# Patient Record
Sex: FEMALE | Race: WHITE | NOT HISPANIC OR LATINO | Employment: OTHER | URBAN - METROPOLITAN AREA
[De-identification: names, ages, dates, MRNs, and addresses within clinical notes are randomized per-mention and may not be internally consistent; named-entity substitution may affect disease eponyms.]

---

## 2017-02-02 RX ORDER — OMEPRAZOLE 20 MG/1
CAPSULE, DELAYED RELEASE ORAL
Qty: 90 CAPSULE | Refills: 0 | Status: SHIPPED | OUTPATIENT
Start: 2017-02-02 | End: 2017-05-01 | Stop reason: SDUPTHER

## 2017-03-09 ENCOUNTER — OFFICE VISIT (OUTPATIENT)
Dept: INTERNAL MEDICINE | Facility: CLINIC | Age: 82
End: 2017-03-09
Payer: MEDICARE

## 2017-03-09 VITALS
WEIGHT: 169.56 LBS | HEIGHT: 67 IN | BODY MASS INDEX: 26.61 KG/M2 | DIASTOLIC BLOOD PRESSURE: 60 MMHG | SYSTOLIC BLOOD PRESSURE: 104 MMHG | HEART RATE: 68 BPM | OXYGEN SATURATION: 96 %

## 2017-03-09 DIAGNOSIS — I10 HYPERTENSION, ESSENTIAL: Primary | ICD-10-CM

## 2017-03-09 DIAGNOSIS — D35.02 ADRENAL ADENOMA, LEFT: ICD-10-CM

## 2017-03-09 DIAGNOSIS — I25.10 CORONARY ARTERY DISEASE INVOLVING NATIVE CORONARY ARTERY OF NATIVE HEART WITHOUT ANGINA PECTORIS: ICD-10-CM

## 2017-03-09 DIAGNOSIS — R05.9 COUGH: ICD-10-CM

## 2017-03-09 DIAGNOSIS — K21.9 GASTROESOPHAGEAL REFLUX DISEASE WITHOUT ESOPHAGITIS: ICD-10-CM

## 2017-03-09 DIAGNOSIS — I67.9 CEREBROVASCULAR DISEASE: ICD-10-CM

## 2017-03-09 DIAGNOSIS — E78.5 HYPERLIPIDEMIA, UNSPECIFIED HYPERLIPIDEMIA TYPE: ICD-10-CM

## 2017-03-09 PROCEDURE — 99214 OFFICE O/P EST MOD 30 MIN: CPT | Mod: S$PBB,,, | Performed by: FAMILY MEDICINE

## 2017-03-09 PROCEDURE — 99999 PR PBB SHADOW E&M-EST. PATIENT-LVL III: CPT | Mod: PBBFAC,,, | Performed by: FAMILY MEDICINE

## 2017-03-09 PROCEDURE — 99213 OFFICE O/P EST LOW 20 MIN: CPT | Mod: PBBFAC | Performed by: FAMILY MEDICINE

## 2017-03-09 RX ORDER — LOSARTAN POTASSIUM AND HYDROCHLOROTHIAZIDE 12.5; 5 MG/1; MG/1
1 TABLET ORAL DAILY
Qty: 90 TABLET | Refills: 3 | Status: SHIPPED | OUTPATIENT
Start: 2017-03-09 | End: 2017-08-25 | Stop reason: ALTCHOICE

## 2017-03-09 NOTE — MR AVS SNAPSHOT
[facility] - [facility] Medicine  [address] [facility] [address]  Phone: [phone]  Fax: [phone]                  Cassidy Dahl   3/9/2017 1:00 PM   Office Visit    Description:  Female : 1934   Provider:  Marcel Lal MD   Department:  [facility] - Internal Medicine           Reason for Visit     Follow-up           Diagnoses this Visit        Comments    Hypertension, essential    -  Primary     Gastroesophageal reflux disease without esophagitis         Adrenal adenoma, left         Coronary artery disease involving native coronary artery of native heart without angina pectoris         Hyperlipidemia, unspecified hyperlipidemia type         Cerebrovascular disease         Cough                To Do List           Future Appointments        Provider Department Dept Phone    4/10/2017 9:00 AM [facility]1 64- LIMIT 450 LBS [facility] [phone]      Goals (5 Years of Data)     None      Follow-Up and Disposition     Return in about 2 months (around 2017) for to reassess treatment for condition or medication, Blood Pressure recheck.    Follow-up and Disposition History       These Medications        Disp Refills Start End    losartan-hydrochlorothiazide 50-12.5 mg (HYZAAR) 50-12.5 mg per tablet 90 tablet 3 3/9/2017     Take 1 tablet by mouth once daily. - Oral    Pharmacy: [facility] - [address] #: [phone]         [facility]     [facility] On [facility] Nurse [facility] Line -  Assistance  Registered nurses in the [facility] On Call Center provide clinical advisement, health education, appointment booking, and other advisory services.  Call for this free service at [phone].             Medications           Message regarding Medications     Verify the changes and/or additions to your medication regime listed below are the same as discussed with your clinician today.  If any of these changes  or additions are incorrect, please notify your healthcare provider.        START taking these NEW medications        Refills    losartan-hydrochlorothiazide 50-12.5 mg (HYZAAR) 50-12.5 mg per tablet 3    Sig: Take 1 tablet by mouth once daily.    Class: Normal    Route: Oral      STOP taking these medications     fluoxetine (PROZAC) 10 MG capsule Take 1 capsule (10 mg total) by mouth once daily.    valsartan-hydrochlorothiazide (DIOVAN-HCT) 320-12.5 mg per tablet Take 1 tablet by mouth nightly.           Verify that the below list of medications is an accurate representation of the medications you are currently taking.  If none reported, the list may be blank. If incorrect, please contact your healthcare provider. Carry this list with you in case of emergency.           Current Medications     amlodipine (NORVASC) 5 MG tablet Take 1 tablet (5 mg total) by mouth nightly.    aspirin 81 MG Chew Take 1 tablet (81 mg total) by mouth every evening.    clobetasol (TEMOVATE) 0.05 % cream Apply topically 2 (two) times daily.    coenzyme Q10 100 mg capsule Take 1 capsule (100 mg total) by mouth every evening. 1 Capsule Oral Every day- last taken 04/01/2016    metoprolol succinate (TOPROL-XL) 50 MG 24 hr tablet Take 1 tablet (50 mg total) by mouth once daily.    multivitamin (ONE DAILY MULTIVITAMIN) per tablet Take 1 tablet by mouth every evening.    omeprazole (PRILOSEC) 20 MG capsule TAKE 1 CAPSULE DAILY    pravastatin (PRAVACHOL) 40 MG tablet Take 1 tablet (40 mg total) by mouth nightly.    alclomethasone (ACLOVATE) 0.05 % cream Apply topically 2 (two) times daily. To affected areas on face, mixed with the ciclopirox cream    cholecalciferol, vitamin D3, (VITAMIN D3) 1,000 unit capsule Take 1 capsule (1,000 Units total) by mouth once daily.    ciclopirox (LOPROX) 0.77 % Crea Apply topically 2 (two) times daily. To affected areas on face, mixed with the alclometasone cream    ciclopirox (PENLAC) 8 % Soln Apply daily to  "affected nail. Must remove with rubbing alcohol once weekly and restart    econazole nitrate 1 % cream Apply topically 2 (two) times daily. To foot X 4 wks    losartan-hydrochlorothiazide 50-12.5 mg (HYZAAR) 50-12.5 mg per tablet Take 1 tablet by mouth once daily.           Clinical Reference Information           Your Vitals Were     BP Pulse Height Weight SpO2 BMI    104/60 (BP Location: Left arm, Patient Position: Sitting, BP Method: Manual) 68 5' 7" (1.702 m) 76.9 kg (169 lb 8.5 oz) 96% 26.55 kg/m2      Blood Pressure          Most Recent Value    BP  104/60      Allergies as of 3/9/2017     Iodine      Immunizations Administered on Date of Encounter - 3/9/2017     None      Orders Placed During Today's Visit      Normal Orders This Visit    Ambulatory referral to Cardiology       Instructions    Melatonin for sleep       Language Assistance Services     ATTENTION: Language assistance services are available, free of charge. Please call [phone].      ATENCIÓN: Si habla nikolay, tiene a guajardo disposición servicios gratuitos de asistencia lingüística. Llame al [phone].     ZEB Ý: N?u b?n nói Ti?ng Vi?t, có các d?ch v? h? tr? ngôn ng? mi?n phí cesar cho b?n. G?i s? [phone].         [facility] complies with applicable Federal civil rights laws and does not discriminate on the basis of race, color, national origin, age, disability, or sex.        "

## 2017-03-09 NOTE — PROGRESS NOTES
Subjective:       Patient ID: Cassidy Dahl is a 82 y.o. female.    Chief Complaint: Follow-up    HPI  Review of Systems   Constitutional: Positive for fatigue. Negative for chills and fever.   HENT: Negative for congestion and trouble swallowing.    Eyes: Negative for redness.   Respiratory: Positive for cough. Negative for chest tightness and shortness of breath.    Cardiovascular: Negative for chest pain, palpitations and leg swelling.   Gastrointestinal: Negative for abdominal pain and blood in stool.   Genitourinary: Negative for hematuria and menstrual problem.   Musculoskeletal: Negative for arthralgias, back pain, gait problem, joint swelling, myalgias and neck pain.   Skin: Negative for color change and rash.   Neurological: Negative for tremors, speech difficulty, weakness, numbness and headaches.   Hematological: Negative for adenopathy. Does not bruise/bleed easily.   Psychiatric/Behavioral: Positive for decreased concentration and sleep disturbance. Negative for behavioral problems and confusion. The patient is not nervous/anxious.        Objective:      Physical Exam   Constitutional: She is oriented to person, place, and time. She appears well-developed and well-nourished.   Eyes: No scleral icterus.   Neck: Normal range of motion. Neck supple.   Cardiovascular: Normal rate, normal heart sounds and intact distal pulses.  Exam reveals no gallop and no friction rub.    No murmur heard.  Pulmonary/Chest: Effort normal and breath sounds normal. No respiratory distress. She has no wheezes. She has no rales.   Abdominal: Soft. Bowel sounds are normal. She exhibits no abdominal bruit.   Musculoskeletal: She exhibits no edema.   Neurological: She is alert and oriented to person, place, and time. She displays no tremor. No cranial nerve deficit. Coordination and gait normal.   Skin: Skin is warm and dry. No rash noted. She is not diaphoretic. No erythema.   Psychiatric: She has a normal mood and affect. Her  behavior is normal. Judgment and thought content normal.   Nursing note and vitals reviewed.      Assessment:       1. Hypertension, essential    2. Gastroesophageal reflux disease without esophagitis    3. Adrenal adenoma, left    4. Coronary artery disease involving native coronary artery of native heart without angina pectoris    5. Hyperlipidemia, unspecified hyperlipidemia type    6. Cerebrovascular disease    7. Cough        Plan:   Cassidy was seen today for follow-up.    Diagnoses and all orders for this visit:    Hypertension, essential    Gastroesophageal reflux disease without esophagitis    Adrenal adenoma, left    Coronary artery disease involving native coronary artery of native heart without angina pectoris  -     Ambulatory referral to Cardiology    Hyperlipidemia, unspecified hyperlipidemia type    Cerebrovascular disease    Cough    Other orders  -     losartan-hydrochlorothiazide 50-12.5 mg (HYZAAR) 50-12.5 mg per tablet; Take 1 tablet by mouth once daily.      See meds, orders, follow up, routing and instructions sections of encounter.  Ms. Dahl is in for followup.  She is due for her adrenal incidentaloma   followup.    States she only took Prozac additional week and quit secondary to side effects.    Predominant complaints today include insomnia and also a cough, which tends to   be worse, nocturnal, she is also concerned about possible Carballo cyst or swelling   to the popliteal fossa of the right knee with minimal tenderness.    Suggested some sleep hygiene items and will change her valsartan to losartan,   consideration for cause of cough, though unlikely.  Follow up in a couple of   months for blood pressure recheck and symptom recheck.      MINE  dd: 03/09/2017 15:46:37 (CST)  td: 03/10/2017 05:46:43 (CST)  Doc ID   #[id number]  Job ID #[id number]    CC:

## 2017-03-21 ENCOUNTER — OFFICE VISIT (OUTPATIENT)
Dept: CARDIOLOGY | Facility: CLINIC | Age: 82
End: 2017-03-21
Payer: MEDICARE

## 2017-03-21 VITALS
SYSTOLIC BLOOD PRESSURE: 137 MMHG | HEART RATE: 64 BPM | WEIGHT: 168.88 LBS | HEIGHT: 67 IN | DIASTOLIC BLOOD PRESSURE: 63 MMHG | BODY MASS INDEX: 26.51 KG/M2

## 2017-03-21 DIAGNOSIS — I51.89 COMBINED SYSTOLIC AND DIASTOLIC CARDIAC DYSFUNCTION: Primary | ICD-10-CM

## 2017-03-21 DIAGNOSIS — E78.5 HYPERLIPIDEMIA, UNSPECIFIED HYPERLIPIDEMIA TYPE: Chronic | ICD-10-CM

## 2017-03-21 DIAGNOSIS — I10 HYPERTENSION, ESSENTIAL: Chronic | ICD-10-CM

## 2017-03-21 DIAGNOSIS — K21.9 GASTROESOPHAGEAL REFLUX DISEASE WITHOUT ESOPHAGITIS: ICD-10-CM

## 2017-03-21 DIAGNOSIS — I25.10 CORONARY ARTERY DISEASE INVOLVING NATIVE CORONARY ARTERY OF NATIVE HEART WITHOUT ANGINA PECTORIS: Chronic | ICD-10-CM

## 2017-03-21 PROCEDURE — 99213 OFFICE O/P EST LOW 20 MIN: CPT | Mod: PBBFAC | Performed by: INTERNAL MEDICINE

## 2017-03-21 PROCEDURE — 99213 OFFICE O/P EST LOW 20 MIN: CPT | Mod: S$PBB,,, | Performed by: INTERNAL MEDICINE

## 2017-03-21 PROCEDURE — 99999 PR PBB SHADOW E&M-EST. PATIENT-LVL III: CPT | Mod: PBBFAC,,, | Performed by: INTERNAL MEDICINE

## 2017-03-21 NOTE — LETTER
March 21, 2017      Marcel Lal MD  [address]  [place] [address]           [facility] - Cardiology  [address]  [place] [address]  Phone: [phone]          Patient: Cassidy Dahl   MR Number: [id number]   YOB: 1934   Date of Visit: 3/21/2017       Dear Dr. Marcel Lal:    Thank you for referring Cassidy Dahl to me for evaluation. Attached you will find relevant portions of my assessment and plan of care.    If you have questions, please do not hesitate to call me. I look forward to following Cassidy Dahl along with you.    Sincerely,    Ashley Angulo NP    Enclosure  CC:  No Recipients    If you would like to receive this communication electronically, please contact [email or web address] or [phone] to request more information on [employer] Link access.    For providers and/or their staff who would like to refer a patient to [facility], please contact us through our one-stop-shop provider referral line, [facility], at [phone].    If you feel you have received this communication in error or would no longer like to receive these types of communications, please e-mail [email or web address]

## 2017-03-21 NOTE — MR AVS SNAPSHOT
[facility] - Cardiology  [address]  [place] [address]  Phone: [phone]                  Cassidy Dahl   3/21/2017 2:00 PM   Office Visit    Description:  Female : 1934   Provider:  John Valenzuela MD   Department:  [facility] - Cardiology           Reason for Visit     Coronary Artery Disease           Diagnoses this Visit        Comments    Hypertension, essential    -  Primary     Coronary artery disease involving native coronary artery of native heart without angina pectoris         Hyperlipidemia, unspecified hyperlipidemia type         Gastroesophageal reflux disease without esophagitis                To Do List           Future Appointments        Provider Department Dept Phone    3/24/2017  8:45 AM [facility] [address] ALL [facility] [phone]    4/10/2017 9:00 AM [facility] CT1 64- LIMIT 450 LBS [facility] [phone]    5/15/2017 9:20 AM Marcel Lal MD [facility] - Internal Medicine [phone]      Goals (5 Years of Data)     None      Follow-Up and Disposition     Return in about 1 year (around 3/21/2018).      [facility]     [facility] Line -  Assistance  Registered nurses in the [facility] Center provide clinical advisement, health education, appointment booking, and other advisory services.  Call for this free service at [phone].             Medications           Message regarding Medications     Verify the changes and/or additions to your medication regime listed below are the same as discussed with your clinician today.  If any of these changes or additions are incorrect, please notify your healthcare provider.        STOP taking these medications     ciclopirox (PENLAC) 8 % Soln Apply daily to affected nail. Must remove with rubbing alcohol once weekly and restart           Verify that the below list of medications is an accurate representation of the medications you are currently taking.  If  "none reported, the list may be blank. If incorrect, please contact your healthcare provider. Carry this list with you in case of emergency.           Current Medications     alclomethasone (ACLOVATE) 0.05 % cream Apply topically 2 (two) times daily. To affected areas on face, mixed with the ciclopirox cream    amlodipine (NORVASC) 5 MG tablet Take 1 tablet (5 mg total) by mouth nightly.    aspirin 81 MG Chew Take 1 tablet (81 mg total) by mouth every evening.    cholecalciferol, vitamin D3, (VITAMIN D3) 1,000 unit capsule Take 1 capsule (1,000 Units total) by mouth once daily.    ciclopirox (LOPROX) 0.77 % Crea Apply topically 2 (two) times daily. To affected areas on face, mixed with the alclometasone cream    clobetasol (TEMOVATE) 0.05 % cream Apply topically 2 (two) times daily.    coenzyme Q10 100 mg capsule Take 1 capsule (100 mg total) by mouth every evening. 1 Capsule Oral Every day- last taken 04/01/2016    econazole nitrate 1 % cream Apply topically 2 (two) times daily. To foot X 4 wks    losartan-hydrochlorothiazide 50-12.5 mg (HYZAAR) 50-12.5 mg per tablet Take 1 tablet by mouth once daily.    metoprolol succinate (TOPROL-XL) 50 MG 24 hr tablet Take 1 tablet (50 mg total) by mouth once daily.    multivitamin (ONE DAILY MULTIVITAMIN) per tablet Take 1 tablet by mouth every evening.    omeprazole (PRILOSEC) 20 MG capsule TAKE 1 CAPSULE DAILY    pravastatin (PRAVACHOL) 40 MG tablet Take 1 tablet (40 mg total) by mouth nightly.           Clinical Reference Information           Your Vitals Were     BP Pulse Height Weight BMI    137/63 (BP Location: Left arm, Patient Position: Sitting, BP Method: Automatic) 64 5' 7" (1.702 m) 76.6 kg (168 lb 14 oz) 26.45 kg/m2      Blood Pressure          Most Recent Value    Right Arm BP - Sitting  141/63    Left Arm BP - Sitting  137/63    BP  137/63      Allergies as of 3/21/2017     Iodine      Immunizations Administered on Date of Encounter - 3/21/2017     None    "   Orders Placed During Today's Visit     Future Labs/Procedures Expected by Expires    Comprehensive metabolic panel  3/21/2017 (Approximate) 3/21/2018    Lipid panel  3/21/2017 (Approximate) 3/21/2018      Instructions    1. Increase activity slowly with a goal of walking 5 days a week for 30 or more minutes each time.   2. Follow a low salt heart healthy diet.  3. Return to cardiology in a year or sooner if you pass out or have other heart related symptoms.   4. Please have fasting labs drawn   Exercise for a Healthier Heart  You may wonder how you can improve the health of your heart. If youre thinking about exercise, youre on the right track. You dont need to become an athlete, but you do need a certain amount of brisk exercise to help strengthen your heart. If you have been diagnosed with a heart condition, your doctor may recommend exercise to help stabilize your condition. To help make exercise a habit, choose safe, fun activities.     Exercise with a friend. When activity is fun, you're more likely to stick with it.     Be sure to check with your healthcare provider before starting an exercise program.   Why exercise?  Exercising regularly offers many healthy rewards. It can help you do all of the following:  · Improve your blood cholesterol level to help prevent further heart trouble  · Lower your blood pressure to help prevent a stroke or heart attack  · Control diabetes, or reduce your risk of getting this disease  · Improve your heart and lung function  · Reach and maintain a healthy weight  · Make your muscles stronger and more limber so you can stay active  · Prevent falls and fractures by slowing the loss of bone mass (osteoporosis)  · Manage stress better  · Reduce your blood pressure  · Improve your sense of self and your body image  Exercise tips  Ease into your routine. Set small goals. Then build on them.  Exercise on most days. Aim for a total of 150 or more minutes of moderate to  vigorous  intensity activity each week. Consider 40 minutes, 3 to 4 times a week. For best results, activity should last for 40 minutes on average. It is OK to work up to the 40 minute period over time. Examples of moderate-intensity activity is walking 1 mile in 15 minutes or 30 to 45 minutes of yard work.  Step up your daily activity level. Along with your exercise program, try being more active throughout the day. Walk instead of drive. Do more household tasks or yard work.  Choose one or more activities you enjoy. Walking is one of the easiest things you can do. You can also try swimming, riding a bike, dancing, or taking an exercise class.  Stop exercising and call your doctor if you:  · Have chest pain or feel dizzy or lightheaded  · Feel burning, tightness, pressure, or heaviness in your chest, neck, shoulders, back, or arms  · Have unusual shortness of breath  · Have increased joint or muscle pain  · Have palpitations or an irregular heartbeat   Date Last Reviewed: 5/1/2016 © 2000-2016 [employer]. [address] [place] [address]. All rights reserved. This information is not intended as a substitute for professional medical care. Always follow your healthcare professional's instructions.             Language Assistance Services     ATTENTION: Language assistance services are available, free of charge. Please call [phone].      ATENCIÓN: Si habla español, tiene a guajardo disposición servicios gratuitos de asistencia lingüística. Llame al [phone].     CHÚ Ý: N?u b?n nói Ti?ng Vi?t, có các d?ch v? h? tr? ngôn ng? mi?n phí dành cho b?n. G?i s? [phone].         [facility] complies with applicable Federal civil rights laws and does not discriminate on the basis of race, color, national origin, age, disability, or sex.

## 2017-03-21 NOTE — PATIENT INSTRUCTIONS
1. Increase activity slowly with a goal of walking 5 days a week for 30 or more minutes each time.   2. Follow a low salt heart healthy diet.  3. Return to cardiology in a year or sooner if you pass out or have other heart related symptoms.   4. Please have fasting labs drawn   Exercise for a Healthier Heart  You may wonder how you can improve the health of your heart. If youre thinking about exercise, youre on the right track. You dont need to become an athlete, but you do need a certain amount of brisk exercise to help strengthen your heart. If you have been diagnosed with a heart condition, your doctor may recommend exercise to help stabilize your condition. To help make exercise a habit, choose safe, fun activities.     Exercise with a friend. When activity is fun, you're more likely to stick with it.     Be sure to check with your healthcare provider before starting an exercise program.   Why exercise?  Exercising regularly offers many healthy rewards. It can help you do all of the following:  · Improve your blood cholesterol level to help prevent further heart trouble  · Lower your blood pressure to help prevent a stroke or heart attack  · Control diabetes, or reduce your risk of getting this disease  · Improve your heart and lung function  · Reach and maintain a healthy weight  · Make your muscles stronger and more limber so you can stay active  · Prevent falls and fractures by slowing the loss of bone mass (osteoporosis)  · Manage stress better  · Reduce your blood pressure  · Improve your sense of self and your body image  Exercise tips  Ease into your routine. Set small goals. Then build on them.  Exercise on most days. Aim for a total of 150 or more minutes of moderate to  vigorous intensity activity each week. Consider 40 minutes, 3 to 4 times a week. For best results, activity should last for 40 minutes on average. It is OK to work up to the 40 minute period over time. Examples of moderate-intensity  activity is walking 1 mile in 15 minutes or 30 to 45 minutes of yard work.  Step up your daily activity level. Along with your exercise program, try being more active throughout the day. Walk instead of drive. Do more household tasks or yard work.  Choose one or more activities you enjoy. Walking is one of the easiest things you can do. You can also try swimming, riding a bike, dancing, or taking an exercise class.  Stop exercising and call your doctor if you:  · Have chest pain or feel dizzy or lightheaded  · Feel burning, tightness, pressure, or heaviness in your chest, neck, shoulders, back, or arms  · Have unusual shortness of breath  · Have increased joint or muscle pain  · Have palpitations or an irregular heartbeat   Date Last Reviewed: 5/1/2016  © 4517-7348 [employer]. [address] [place] [address]. All rights reserved. This information is not intended as a substitute for professional medical care. Always follow your healthcare professional's instructions.

## 2017-03-24 ENCOUNTER — HOSPITAL ENCOUNTER (OUTPATIENT)
Dept: RADIOLOGY | Facility: HOSPITAL | Age: 82
Discharge: HOME | End: 2017-03-24
Attending: FAMILY MEDICINE
Payer: MEDICARE

## 2017-03-24 DIAGNOSIS — R79.89 ELEVATED LFTS: ICD-10-CM

## 2017-03-24 DIAGNOSIS — R79.9 ABNORMAL BLOOD CHEMISTRY: ICD-10-CM

## 2017-03-24 PROCEDURE — 76700 US EXAM ABDOM COMPLETE: CPT | Mod: 26,,, | Performed by: RADIOLOGY

## 2017-03-24 PROCEDURE — 76700 US EXAM ABDOM COMPLETE: CPT | Mod: TC

## 2017-03-27 ENCOUNTER — TELEPHONE (OUTPATIENT)
Dept: GASTROENTEROLOGY | Facility: CLINIC | Age: 82
End: 2017-03-27

## 2017-03-27 DIAGNOSIS — K86.2 CYST AND PSEUDOCYST OF PANCREAS: Primary | ICD-10-CM

## 2017-03-27 DIAGNOSIS — K86.3 CYST AND PSEUDOCYST OF PANCREAS: Primary | ICD-10-CM

## 2017-03-30 ENCOUNTER — TELEPHONE (OUTPATIENT)
Dept: GASTROENTEROLOGY | Facility: CLINIC | Age: 82
End: 2017-03-30

## 2017-04-10 ENCOUNTER — HOSPITAL ENCOUNTER (OUTPATIENT)
Dept: RADIOLOGY | Facility: HOSPITAL | Age: 82
Discharge: HOME | End: 2017-04-10
Attending: FAMILY MEDICINE
Payer: MEDICARE

## 2017-04-10 DIAGNOSIS — D35.00 ADRENAL ADENOMA, UNSPECIFIED LATERALITY: ICD-10-CM

## 2017-04-10 PROCEDURE — 74150 CT ABDOMEN W/O CONTRAST: CPT | Mod: TC

## 2017-04-10 PROCEDURE — 74150 CT ABDOMEN W/O CONTRAST: CPT | Mod: 26,GC,, | Performed by: RADIOLOGY

## 2017-04-12 ENCOUNTER — PATIENT MESSAGE (OUTPATIENT)
Dept: RESEARCH | Facility: HOSPITAL | Age: 82
End: 2017-04-12

## 2017-04-14 RX ORDER — AMLODIPINE BESYLATE 5 MG/1
TABLET ORAL
Qty: 90 TABLET | Refills: 0 | Status: SHIPPED | OUTPATIENT
Start: 2017-04-14 | End: 2017-07-13 | Stop reason: SDUPTHER

## 2017-05-01 ENCOUNTER — TELEPHONE (OUTPATIENT)
Dept: ENDOSCOPY | Facility: HOSPITAL | Age: 82
End: 2017-05-01

## 2017-05-01 RX ORDER — OMEPRAZOLE 20 MG/1
CAPSULE, DELAYED RELEASE ORAL
Qty: 90 CAPSULE | Refills: 0 | Status: SHIPPED | OUTPATIENT
Start: 2017-05-01 | End: 2017-07-31 | Stop reason: SDUPTHER

## 2017-05-01 NOTE — TELEPHONE ENCOUNTER
Contacted patient regarding EUS scheduled 5/17/17 at 0900. Reviewed patient's medical history, allergies, and medications. Verbally reviewed prep instructions with patient. Patient verbalized understanding. Prep instructions mailed to patient. Patient has no further questions at this time.

## 2017-05-09 ENCOUNTER — TELEPHONE (OUTPATIENT)
Dept: GASTROENTEROLOGY | Facility: CLINIC | Age: 82
End: 2017-05-09

## 2017-05-16 ENCOUNTER — TELEPHONE (OUTPATIENT)
Dept: GASTROENTEROLOGY | Facility: CLINIC | Age: 82
End: 2017-05-16

## 2017-05-22 ENCOUNTER — TELEPHONE (OUTPATIENT)
Dept: GASTROENTEROLOGY | Facility: CLINIC | Age: 82
End: 2017-05-22

## 2017-06-14 ENCOUNTER — TELEPHONE (OUTPATIENT)
Dept: GASTROENTEROLOGY | Facility: CLINIC | Age: 82
End: 2017-06-14

## 2017-07-02 ENCOUNTER — PATIENT MESSAGE (OUTPATIENT)
Dept: INTERNAL MEDICINE | Facility: CLINIC | Age: 82
End: 2017-07-02

## 2017-07-05 ENCOUNTER — PATIENT MESSAGE (OUTPATIENT)
Dept: ADMINISTRATIVE | Facility: OTHER | Age: 82
End: 2017-07-05

## 2017-07-05 ENCOUNTER — OFFICE VISIT (OUTPATIENT)
Dept: INTERNAL MEDICINE | Facility: CLINIC | Age: 82
End: 2017-07-05
Attending: FAMILY MEDICINE
Payer: MEDICARE

## 2017-07-05 VITALS
BODY MASS INDEX: 25.44 KG/M2 | HEIGHT: 67 IN | DIASTOLIC BLOOD PRESSURE: 50 MMHG | OXYGEN SATURATION: 99 % | SYSTOLIC BLOOD PRESSURE: 180 MMHG | HEART RATE: 57 BPM | WEIGHT: 162.06 LBS

## 2017-07-05 DIAGNOSIS — Z78.9 STATIN INTOLERANCE: ICD-10-CM

## 2017-07-05 DIAGNOSIS — I10 HYPERTENSION, ESSENTIAL: Primary | ICD-10-CM

## 2017-07-05 DIAGNOSIS — I77.89 ENLARGED AORTA: ICD-10-CM

## 2017-07-05 DIAGNOSIS — N95.9 MENOPAUSAL AND PERIMENOPAUSAL DISORDER: ICD-10-CM

## 2017-07-05 DIAGNOSIS — E78.5 HYPERLIPIDEMIA, UNSPECIFIED HYPERLIPIDEMIA TYPE: ICD-10-CM

## 2017-07-05 DIAGNOSIS — I25.10 CORONARY ARTERY DISEASE INVOLVING NATIVE CORONARY ARTERY OF NATIVE HEART WITHOUT ANGINA PECTORIS: ICD-10-CM

## 2017-07-05 DIAGNOSIS — K21.9 GASTROESOPHAGEAL REFLUX DISEASE WITHOUT ESOPHAGITIS: ICD-10-CM

## 2017-07-05 PROCEDURE — 99213 OFFICE O/P EST LOW 20 MIN: CPT | Mod: PBBFAC | Performed by: FAMILY MEDICINE

## 2017-07-05 PROCEDURE — 1159F MED LIST DOCD IN RCRD: CPT | Mod: ,,, | Performed by: FAMILY MEDICINE

## 2017-07-05 PROCEDURE — 99999 PR PBB SHADOW E&M-EST. PATIENT-LVL III: CPT | Mod: PBBFAC,,, | Performed by: FAMILY MEDICINE

## 2017-07-05 PROCEDURE — G0009 ADMIN PNEUMOCOCCAL VACCINE: HCPCS | Mod: PBBFAC

## 2017-07-05 PROCEDURE — 90732 PPSV23 VACC 2 YRS+ SUBQ/IM: CPT | Mod: PBBFAC

## 2017-07-05 PROCEDURE — 1126F AMNT PAIN NOTED NONE PRSNT: CPT | Mod: ,,, | Performed by: FAMILY MEDICINE

## 2017-07-05 PROCEDURE — 99214 OFFICE O/P EST MOD 30 MIN: CPT | Mod: S$PBB,,, | Performed by: FAMILY MEDICINE

## 2017-07-05 NOTE — PROGRESS NOTES
Subjective:       Patient ID: Cassidy Dahl is a 82 y.o. female.    Chief Complaint: Follow-up    HPI  Review of Systems   Constitutional: Positive for fatigue. Negative for chills and fever.   HENT: Negative for congestion and trouble swallowing.    Eyes: Negative for redness.   Respiratory: Negative for cough, chest tightness and shortness of breath.    Cardiovascular: Negative for chest pain, palpitations and leg swelling.   Gastrointestinal: Negative for abdominal pain and blood in stool.   Genitourinary: Negative for hematuria and menstrual problem.   Musculoskeletal: Negative for arthralgias, back pain, gait problem, joint swelling, myalgias and neck pain.   Skin: Negative for color change and rash.   Neurological: Negative for tremors, speech difficulty, weakness, numbness and headaches.   Hematological: Negative for adenopathy. Does not bruise/bleed easily.   Psychiatric/Behavioral: Negative for behavioral problems, confusion and sleep disturbance. The patient is not nervous/anxious.        Objective:      Physical Exam   Constitutional: She is oriented to person, place, and time. She appears well-developed and well-nourished.   Eyes: No scleral icterus.   Neck: Normal range of motion. Neck supple.   Cardiovascular: Normal rate, normal heart sounds and intact distal pulses.  Exam reveals no gallop and no friction rub.    No murmur heard.  Pulmonary/Chest: Effort normal and breath sounds normal. No respiratory distress. She has no wheezes. She has no rales.   Abdominal: Soft. Bowel sounds are normal. She exhibits no abdominal bruit.   Musculoskeletal: She exhibits no edema.   Neurological: She is alert and oriented to person, place, and time. She displays no tremor. No cranial nerve deficit. Coordination and gait normal.   Skin: Skin is warm and dry. No rash noted. She is not diaphoretic. No erythema.   Psychiatric: She has a normal mood and affect. Her behavior is normal. Judgment and thought content  normal.   Nursing note and vitals reviewed.      Assessment:       1. Hypertension, essential    2. Hyperlipidemia, unspecified hyperlipidemia type    3. Gastroesophageal reflux disease without esophagitis    4. Coronary artery disease involving native coronary artery of native heart without angina pectoris    5. Enlarged aorta    6. Statin intolerance    7. Menopausal and perimenopausal disorder        Plan:   Cassidy was seen today for follow-up.    Diagnoses and all orders for this visit:    Hypertension, essential  -     Hypertension Digital Medicine (HDMP) Enrollment Order  -     Hypertension Digital Medicine ([facility]): Assign Onboarding Questionnaires    Hyperlipidemia, unspecified hyperlipidemia type  Comments:  not at goal, see below    Gastroesophageal reflux disease without esophagitis    Coronary artery disease involving native coronary artery of native heart without angina pectoris    Enlarged aorta    Statin intolerance  Comments:  prior difficulty with lipitor, currently tolerating pravastatin    Menopausal and perimenopausal disorder  -     DXA Bone Density Spine And Hip; Future    Other orders  -     Pneumococcal Polysaccharide Vaccine (23 Valent) (SQ/IM)      See meds, orders, follow up, routing and instructions sections of encounter.  Dictation #1  MRN:[id number]  CSN:[id number]  [facility] Dictation services are down due to external  issues in [place], not affecting Epic or internal information systems. Abbreviated chart note is provided pending restoration of services.  Has recently seen Dr. Valenzuela. No CAD sx at this time.

## 2017-07-07 ENCOUNTER — PATIENT OUTREACH (OUTPATIENT)
Dept: OTHER | Facility: OTHER | Age: 82
End: 2017-07-07

## 2017-07-07 NOTE — TELEPHONE ENCOUNTER
HTN Digital Medicine Program Medication Reconciliation Outreach    Called patient to introduce her into the HDMP. Reviewed program details including blood pressure goals, technique for taking readings (timing, cuff placement, body positioning, iHealth temitope), and what to do in case of emergency. Introduced patient to members of care team (health , clinician, and responsible provider). Verified patient's understanding of [facility] MyChart temitope use for contacting clinical team and to ensure that iHealth cuff readings continue to transmit by logging in once every 2 weeks. Her son, Gilmar, will be helping her with BP readings and using her iPhone during the program. Confirmation text sent and patient received.    Screening Questionnaire Review:  1. Depression - not indicated  2. Sleep apnea - not indicated       Verified the following information with the patient:  1. Medication list  Current Outpatient Prescriptions on File Prior to Visit   Medication Sig Dispense Refill    alclomethasone (ACLOVATE) 0.05 % cream Apply topically 2 (two) times daily. To affected areas on face, mixed with the ciclopirox cream 45 g 1    amlodipine (NORVASC) 5 MG tablet TAKE 1 TABLET NIGHTLY 90 tablet 0    aspirin 81 MG Chew Take 1 tablet (81 mg total) by mouth every evening.      cholecalciferol, vitamin D3, (VITAMIN D3) 1,000 unit capsule Take 1 capsule (1,000 Units total) by mouth once daily. 90 capsule 2    ciclopirox (LOPROX) 0.77 % Crea Apply topically 2 (two) times daily. To affected areas on face, mixed with the alclometasone cream 30 g 1    clobetasol (TEMOVATE) 0.05 % cream Apply topically 2 (two) times daily. 45 g 1    coenzyme Q10 100 mg capsule Take 1 capsule (100 mg total) by mouth every evening. 1 Capsule Oral Every day- last taken 04/01/2016      econazole nitrate 1 % cream Apply topically 2 (two) times daily. To foot X 4 wks 85 g 2    losartan-hydrochlorothiazide 50-12.5 mg (HYZAAR) 50-12.5 mg per tablet Take 1  tablet by mouth once daily. 90 tablet 3    metoprolol succinate (TOPROL-XL) 50 MG 24 hr tablet Take 1 tablet (50 mg total) by mouth once daily. 90 tablet 3    multivitamin (ONE DAILY MULTIVITAMIN) per tablet Take 1 tablet by mouth every evening.      omeprazole (PRILOSEC) 20 MG capsule TAKE 1 CAPSULE DAILY 90 capsule 0    pravastatin (PRAVACHOL) 40 MG tablet Take 1 tablet (40 mg total) by mouth nightly. 90 tablet 3     No current facility-administered medications on file prior to visit.        Previous ADRs  CCB: Nifedipine/amlodipine: swelling?    ACE: Lisinopril/Benazepril/etc    Diuretics: Cramping/etc    Beta Blockers: Fatigue?    2. Medication compliance: has been compliant with the medicaiton regimen    3. Medication Allergies:   Review of patient's allergies indicates:   Allergen Reactions    Iodine      Other reaction(s): Hives, invasive    Lipitor [atorvastatin]      Leg pain       Explained that our goal is to get her BP consistently below 140/90mmHg.  Patient denies having further questions, concerns. BP is not at goal, patient will begin taking more readings as able.     Last 5 Patient Entered [place] Current 30 Day Average: 140/60     Recent Readings 7/6/2017 7/5/2017    Systolic BP (mmHg) 142 138    Diastolic BP (mmHg) 64 56    Pulse 60 64

## 2017-07-07 NOTE — LETTER
Shobha Lemons, Karel  [address] [place] [address]     Dear Cassidy Dahl,    Welcome to the [facility] Hypertension Digital Medicine Program!         My name is Shobha Lemons PharmD and I am your dedicated Digital Medicine clinician.  As an expert in medication management, I will help ensure that the medications you are taking continue to provide you with the intended benefits.      I am Chandni Biggs and I will be your health  for the duration of the program.  My  job is to help you identify lifestyle changes to improve your blood pressure control.  We will talk about nutrition, exercise, and other ways that you may be able to adjust your current habits to better your health. Together, we will work to improve your overall health and encourage you to meet your goals for a healthier lifestyle.    What we expect from YOU:    You will need to take blood pressure readings multiple times a week and no less than one reading per week.   It is important that you take your measurements at different times during the day, when possible.     What you should expect from your Digital Medicine Care Team:   We will provide you with education about high blood pressure, including lifestyle changes that could help you to control your blood pressure.   We will review your weekly readings and provide you with monthly blood pressure progress reports after you have been in the program for more than 30 days.   We will send monthly progress reports on your blood pressure control to your physician so they can follow along with your progress as well.    You will be able to reach me by phone at [phone] or through your [facility] account by clicking my name under Care Team on the right side of the home screen.    I look forward to working with you to achieve your blood pressure goals!    Sincerely,    Shobha Lemons PharmD  Your personal clinician    Please visit [email or web address] to  learn more about high blood pressure and what you can do lower your blood pressure.                                                                                           Cassidy Dahl  [address] [facility] [place] [address]

## 2017-07-10 ENCOUNTER — TELEPHONE (OUTPATIENT)
Dept: ENDOSCOPY | Facility: HOSPITAL | Age: 82
End: 2017-07-10

## 2017-07-13 ENCOUNTER — PATIENT OUTREACH (OUTPATIENT)
Dept: OTHER | Facility: OTHER | Age: 82
End: 2017-07-13

## 2017-07-13 RX ORDER — AMLODIPINE BESYLATE 5 MG/1
TABLET ORAL
Qty: 90 TABLET | Refills: 0 | Status: SHIPPED | OUTPATIENT
Start: 2017-07-13 | End: 2017-10-11 | Stop reason: SDUPTHER

## 2017-07-13 NOTE — PROGRESS NOTES
Last 5 Patient Entered [place] Current 30 Day Average: 146/62     Recent Readings 7/13/2017 7/12/2017 7/12/2017 7/11/2017 7/10/2017    Systolic BP (mmHg) 140 144 140 151 156    Diastolic BP (mmHg) 67 53 58 59 71    Pulse 58 58 31 59 57          Feels she follows a mostly low sodium diet but does eat pizza. Doesn't add salt to food.  Walking to stay active.  Non smoker.  Glass of wine maybe 2x/yr.  Low heart rate. Feels fatigued very often.  Sounds as if she has some difficulty breathing/catching breath.  Daughter is a Nurse. Sends her daughter her readings also.    Initial introduction completed with the Ms. Cassidy CAI Nabila and the role of the health  was explained via [facility].   Expectations and the process of the program was explained as well. I encouraged her to call or message me back with any questions she may have. I will follow up in a few weeks to see how she are doing.     Resources on diet and exercise were sent.     she is aware that I am not available for emergencies and to call 911 or [facility] if one arises.  she is aware of the importance of medication adherence.  she is aware of the importance of diet and exercise.  she is aware that his sodium intake should be no more than 2000mg per day.  she is aware that the recommended physical activity each week should be about 30 minutes per day at least 5 times per week.   she is aware of the importance of taking BP readings at least weekly if not more and during different times each day.  she is aware that the health  can be used as a resource for lifestyle modifications to help reduce or maintain a healthy BP

## 2017-07-19 ENCOUNTER — PATIENT MESSAGE (OUTPATIENT)
Dept: INTERNAL MEDICINE | Facility: CLINIC | Age: 82
End: 2017-07-19

## 2017-07-19 ENCOUNTER — TELEPHONE (OUTPATIENT)
Dept: INTERNAL MEDICINE | Facility: CLINIC | Age: 82
End: 2017-07-19

## 2017-07-19 NOTE — TELEPHONE ENCOUNTER
----- Message from Tiffany Lim MA sent at 7/19/2017  4:15 PM CDT -----  Contact: van / Jenny - [phone]   Type: Returning a call    Who left a message? Melanie     When did the practice call? 7/19/17    Comments: stated another doctor can sign. Please call. Thanks!

## 2017-07-19 NOTE — TELEPHONE ENCOUNTER
Spoke to pt informed pt  will be out till Monday and to her her daughter give us a call back in what she will like to do next

## 2017-07-31 RX ORDER — OMEPRAZOLE 20 MG/1
20 CAPSULE, DELAYED RELEASE ORAL DAILY
Qty: 90 CAPSULE | Refills: 0 | Status: SHIPPED | OUTPATIENT
Start: 2017-07-31 | End: 2017-10-29 | Stop reason: SDUPTHER

## 2017-08-02 ENCOUNTER — ANESTHESIA EVENT (OUTPATIENT)
Dept: ENDOSCOPY | Facility: HOSPITAL | Age: 82
End: 2017-08-02
Payer: MEDICARE

## 2017-08-03 ENCOUNTER — SURGERY (OUTPATIENT)
Age: 82
End: 2017-08-03

## 2017-08-03 ENCOUNTER — ANESTHESIA (OUTPATIENT)
Dept: ENDOSCOPY | Facility: HOSPITAL | Age: 82
End: 2017-08-03
Payer: MEDICARE

## 2017-08-03 ENCOUNTER — HOSPITAL ENCOUNTER (OUTPATIENT)
Facility: HOSPITAL | Age: 82
Discharge: HOME | End: 2017-08-03
Attending: INTERNAL MEDICINE | Admitting: INTERNAL MEDICINE
Payer: MEDICARE

## 2017-08-03 VITALS
HEIGHT: 67 IN | OXYGEN SATURATION: 100 % | BODY MASS INDEX: 24.8 KG/M2 | RESPIRATION RATE: 19 BRPM | SYSTOLIC BLOOD PRESSURE: 163 MMHG | TEMPERATURE: 98 F | HEART RATE: 72 BPM | DIASTOLIC BLOOD PRESSURE: 44 MMHG | WEIGHT: 158 LBS

## 2017-08-03 DIAGNOSIS — K86.2 PANCREAS CYST: ICD-10-CM

## 2017-08-03 PROCEDURE — 37000009 HC ANESTHESIA EA ADD 15 MINS: Performed by: INTERNAL MEDICINE

## 2017-08-03 PROCEDURE — 43259 EGD US EXAM DUODENUM/JEJUNUM: CPT | Mod: ,,, | Performed by: INTERNAL MEDICINE

## 2017-08-03 PROCEDURE — D9220A PRA ANESTHESIA: Mod: ANES,,, | Performed by: ANESTHESIOLOGY

## 2017-08-03 PROCEDURE — 37000008 HC ANESTHESIA 1ST 15 MINUTES: Performed by: INTERNAL MEDICINE

## 2017-08-03 PROCEDURE — 25000003 PHARM REV CODE 250: Performed by: NURSE ANESTHETIST, CERTIFIED REGISTERED

## 2017-08-03 PROCEDURE — 63600175 PHARM REV CODE 636 W HCPCS: Performed by: NURSE ANESTHETIST, CERTIFIED REGISTERED

## 2017-08-03 PROCEDURE — 25000003 PHARM REV CODE 250: Performed by: INTERNAL MEDICINE

## 2017-08-03 PROCEDURE — D9220A PRA ANESTHESIA: Mod: CRNA,,, | Performed by: NURSE ANESTHETIST, CERTIFIED REGISTERED

## 2017-08-03 PROCEDURE — 43259 EGD US EXAM DUODENUM/JEJUNUM: CPT | Performed by: INTERNAL MEDICINE

## 2017-08-03 RX ORDER — PROPOFOL 10 MG/ML
VIAL (ML) INTRAVENOUS CONTINUOUS PRN
Status: DISCONTINUED | OUTPATIENT
Start: 2017-08-03 | End: 2017-08-03

## 2017-08-03 RX ORDER — SODIUM CHLORIDE 9 MG/ML
INJECTION, SOLUTION INTRAVENOUS CONTINUOUS
Status: DISCONTINUED | OUTPATIENT
Start: 2017-08-03 | End: 2017-08-03 | Stop reason: HOSPADM

## 2017-08-03 RX ORDER — LIDOCAINE HCL/PF 100 MG/5ML
SYRINGE (ML) INTRAVENOUS
Status: DISCONTINUED | OUTPATIENT
Start: 2017-08-03 | End: 2017-08-03

## 2017-08-03 RX ORDER — SODIUM CHLORIDE 0.9 % (FLUSH) 0.9 %
3 SYRINGE (ML) INJECTION
Status: DISCONTINUED | OUTPATIENT
Start: 2017-08-03 | End: 2017-08-03 | Stop reason: HOSPADM

## 2017-08-03 RX ORDER — GLYCOPYRROLATE 0.2 MG/ML
INJECTION INTRAMUSCULAR; INTRAVENOUS
Status: DISCONTINUED | OUTPATIENT
Start: 2017-08-03 | End: 2017-08-03

## 2017-08-03 RX ORDER — PROPOFOL 10 MG/ML
VIAL (ML) INTRAVENOUS
Status: DISCONTINUED | OUTPATIENT
Start: 2017-08-03 | End: 2017-08-03

## 2017-08-03 RX ADMIN — PROPOFOL 40 MG: 10 INJECTION, EMULSION INTRAVENOUS at 08:08

## 2017-08-03 RX ADMIN — PROPOFOL 150 MCG/KG/MIN: 10 INJECTION, EMULSION INTRAVENOUS at 08:08

## 2017-08-03 RX ADMIN — SODIUM CHLORIDE: 0.9 INJECTION, SOLUTION INTRAVENOUS at 09:08

## 2017-08-03 RX ADMIN — GLYCOPYRROLATE 0.2 MG: 0.2 INJECTION, SOLUTION INTRAMUSCULAR; INTRAVENOUS at 08:08

## 2017-08-03 RX ADMIN — LIDOCAINE HYDROCHLORIDE 60 MG: 20 INJECTION, SOLUTION INTRAVENOUS at 08:08

## 2017-08-03 NOTE — DISCHARGE SUMMARY
Discharge Summary/Instructions for after an Upper EUS    Patient Name: Cassidy Dahl  Patient MRN: [id number]  Patient YOB: 1934 Thursday, August 03, 2017  Sharath Shannon MD    1.  Do Not eat or drink anything for 1 hour.  Try sips of water first.  If tolerated, resume your regular diet or one recommended by your physician.  2.  Do not drive, operate machinery, make critical decisions, or do activities that require coordination or balance for 24 hours.  3.  You may experience a sore throat for 24 to 48 hours.  You may use throat lozenges or gargle with warm salt water to relieve the discomfort.  4.  Because air was put into your stomach during the procedure, you may experience some belching.  5.  Go directly to the emergency room if you notice any of the following:     Chills and/or fever over 101 F   Persistent vomiting or vomiting with blood   Severe abdominal pain, other than gas cramps   Severe chest pain   Black, tarry stools    Your doctor recommends these additional instructions:    You are being discharged to home.   Your physician has recommended a repeat upper endoscopic ultrasound in two years for surveillance.    If you have any questions on the above instructions, call the GI Lab and ask for an endoscopy nurse.    If you have any problems, please call your physician.  EMERGENCY PHONE NUMBER: [phone]  LAB RESULTS: [phone]

## 2017-08-03 NOTE — TRANSFER OF CARE
"Anesthesia Transfer of Care Note    Patient: Cassidy CAI Fourroux    Procedure(s) Performed: Procedure(s) (LRB):  ULTRASOUND-ENDOSCOPIC-UPPER (N/A)    Patient location: [facility]    Anesthesia Type: general    Transport from OR: Transported from OR on room air with adequate spontaneous ventilation    Post pain: adequate analgesia    Post assessment: no apparent anesthetic complications    Post vital signs: stable    Level of consciousness: sedated    Nausea/Vomiting: no nausea/vomiting    Complications: none    Transfer of care protocol was followed      Last vitals:   Visit Vitals  BP (!) 176/68 (BP Location: Left arm, Patient Position: Lying, BP Method: Automatic)   Pulse (!) 58   Temp 36.6 °C (97.8 °F) (Oral)   Resp 16   Ht 5' 7" (1.702 m)   Wt 71.7 kg (158 lb)   SpO2 100%   Breastfeeding? No   BMI 24.75 kg/m²     "

## 2017-08-03 NOTE — PLAN OF CARE
Patient and patient's son received discharge instructions.  Patient and patient's son verbalized understanding of all instructions given and all questions were addressed prior to patient's discharge.  Patient's vital signs are stable and within patient's baseline.  Patient tolerated clear liquids PO.  Patient denies pain.  Patient denies nausea and vomiting at this time.  Patient meets all criteria for discharge at this time.  All required consents present in patient's chart upon patient's discharge.

## 2017-08-03 NOTE — PATIENT INSTRUCTIONS
Discharge Summary/Instructions for after an Upper EUS  Patient Name: Cassidy Dahl  Patient MRN: [id number]  Patient YOB: 1934 Thursday, August 03, 2017  Sharath Shannon MD  1.  Do Not eat or drink anything for 1 hour.  Try sips of water first.  If   tolerated, resume your regular diet or one recommended by your physician.  2.  Do not drive, operate machinery, make critical decisions, or do   activities that require coordination or balance for 24 hours.  3.  You may experience a sore throat for 24 to 48 hours.  You may use throat   lozenges or gargle with warm salt water to relieve the discomfort.  4.  Because air was put into your stomach during the procedure, you may   experience some belching.  5.  Go directly to the emergency room if you notice any of the following:   Chills and/or fever over 101 F   Persistent vomiting or vomiting with blood   Severe abdominal pain, other than gas cramps   Severe chest pain   Black, tarry stools  Your doctor recommends these additional instructions:  You are being discharged to home.   Your physician has recommended a repeat upper endoscopic ultrasound in two   years for surveillance.  If you have any questions on the above instructions, call the GI Lab and ask   for an endoscopy nurse.  If you have any problems, please call your physician.  EMERGENCY PHONE NUMBER: [phone]  LAB RESULTS: [phone]  Sharath Shannon MD  8/3/2017 9:26:08 AM  This report has been verified and signed electronically.

## 2017-08-03 NOTE — ANESTHESIA PREPROCEDURE EVALUATION
08/03/2017  Cassidy Dahl is a 82 y.o., female.  Past Medical History:   Diagnosis Date    Anxiety     Arthritis     Cataract     Colon polyp     Coronary artery disease involving native coronary artery without angina pectoris 5/27/2016    Depression     GERD (gastroesophageal reflux disease)     Hyperlipidemia     Hypertension     Intracranial vascular stenosis     Stroke 1994 and 2002       Anesthesia Evaluation    I have reviewed the Patient Summary Reports.    I have reviewed the Nursing Notes.   I have reviewed the Medications.     Review of Systems      Physical Exam  General:  Well nourished    Airway/Jaw/Neck:  Airway Findings: Mouth Opening: Normal General Airway Assessment: Adult  Mallampati: II  Improves to II with phonation.  Jaw/Neck Findings:  Limited Ability to Prognath  Neck ROM: Normal ROM     Eyes/Ears/Nose:  Eyes/Ears/Nose Findings:    Dental:  Dental Findings: In tact   Chest/Lungs:  Chest/Lungs Findings: Clear to auscultation, Normal Respiratory Rate     Heart/Vascular:  Heart Findings: Rate: Normal  Rhythm: Regular Rhythm  Sounds: Normal     Abdomen:  Abdomen Findings:  Normal     Musculoskeletal:  Musculoskeletal Findings:    Skin:  Skin Findings:     Mental Status:  Mental Status Findings:  Cooperative, Alert and Oriented         Anesthesia Plan  Type of Anesthesia, risks & benefits discussed:  Anesthesia Type:  general, MAC  Patient's Preference:   Intra-op Monitoring Plan:   Intra-op Monitoring Plan Comments:   Post Op Pain Control Plan:   Post Op Pain Control Plan Comments:   Induction:   IV  Beta Blocker:  Patient is not currently on a Beta-Blocker (No further documentation required).       Informed Consent: Patient understands risks and agrees with Anesthesia plan.  Questions answered. Anesthesia consent signed with patient.  ASA Score: 2     Day of Surgery Review of  History & Physical:    H&P update referred to the surgeon.         Ready For Surgery From Anesthesia Perspective.

## 2017-08-03 NOTE — H&P
History & Physical - Short Stay  Gastroenterology      SUBJECTIVE:     Procedure: EUS    Chief Complaint/Indication for Procedure: Pancreas cyst    History of Present Illness:  Patient is a 82 y.o. female presents with history of a pancreas cyst here for surveillance.     PTA Medications   Medication Sig    alclomethasone (ACLOVATE) 0.05 % cream Apply topically 2 (two) times daily. To affected areas on face, mixed with the ciclopirox cream    amlodipine (NORVASC) 5 MG tablet TAKE 1 TABLET NIGHTLY    aspirin 81 MG Chew Take 1 tablet (81 mg total) by mouth every evening.    cholecalciferol, vitamin D3, (VITAMIN D3) 1,000 unit capsule Take 1 capsule (1,000 Units total) by mouth once daily.    ciclopirox (LOPROX) 0.77 % Crea Apply topically 2 (two) times daily. To affected areas on face, mixed with the alclometasone cream    clobetasol (TEMOVATE) 0.05 % cream Apply topically 2 (two) times daily.    coenzyme Q10 100 mg capsule Take 1 capsule (100 mg total) by mouth every evening. 1 Capsule Oral Every day- last taken 04/01/2016    econazole nitrate 1 % cream Apply topically 2 (two) times daily. To foot X 4 wks    losartan-hydrochlorothiazide 50-12.5 mg (HYZAAR) 50-12.5 mg per tablet Take 1 tablet by mouth once daily.    metoprolol succinate (TOPROL-XL) 50 MG 24 hr tablet Take 1 tablet (50 mg total) by mouth once daily.    multivitamin (ONE DAILY MULTIVITAMIN) per tablet Take 1 tablet by mouth every evening.    omeprazole (PRILOSEC) 20 MG capsule Take 1 capsule (20 mg total) by mouth once daily.    pravastatin (PRAVACHOL) 40 MG tablet Take 1 tablet (40 mg total) by mouth nightly.       Review of patient's allergies indicates:   Allergen Reactions    Iodine      Other reaction(s): Hives, invasive    Lipitor [atorvastatin]      Leg pain        Past Medical History:   Diagnosis Date    Anxiety     Arthritis     Cataract     Colon polyp     Coronary artery disease involving native coronary artery without  angina pectoris 5/27/2016    Depression     GERD (gastroesophageal reflux disease)     Hyperlipidemia     Hypertension     Intracranial vascular stenosis     Stroke 1994 and 2002     Past Surgical History:   Procedure Laterality Date    BROW LIFT AND BLEPHAROPLASTY      CARDIAC CATHETERIZATION  04/21/2016    ENTROPIAN REPAIR      Left Eye    EYE SURGERY      blepharoplasty    FOOT TENDON SURGERY  1-14-14    right foot    HIP FRACTURE SURGERY      right with cailin    SPINE SURGERY      neck surgery ???     Family History   Problem Relation Age of Onset    Heart disease Father     Cirrhosis Father     Heart attack Father     Cancer Sister      breast cancer    Heart disease Sister     Cancer Sister      breast cancer    Cancer Sister      leukemia    Heart disease Sister     Cancer Sister      lung cancer    Melanoma Sister     Diabetes Brother     Glaucoma Maternal Grandmother     Colon polyps Neg Hx     Amblyopia Neg Hx     Blindness Neg Hx     Cataracts Neg Hx     Hypertension Neg Hx     Macular degeneration Neg Hx     Retinal detachment Neg Hx     Strabismus Neg Hx     Stroke Neg Hx     Thyroid disease Neg Hx     Celiac disease Neg Hx     Crohn's disease Neg Hx     Ulcerative colitis Neg Hx     Stomach cancer Neg Hx     Esophageal cancer Neg Hx     Rectal cancer Neg Hx     Liver cancer Neg Hx     Irritable bowel syndrome Neg Hx      Social History   Substance Use Topics    Smoking status: Never Smoker    Smokeless tobacco: Never Used    Alcohol use 0.6 oz/week     1 Glasses of wine per week      Comment: occ- per year       Review of Systems:  Constitutional: no fever or chills  Respiratory: no cough or shortness of breath  Cardiovascular: no chest pain or palpitations  Gastrointestinal: no nausea or vomiting, no abdominal pain or change in bowel habits    OBJECTIVE:     Vital Signs (Most Recent)       Physical Exam:  General: well developed, well nourished  Lungs:   clear to auscultation bilaterally and normal respiratory effort  Heart: regular rate, S1, S2 normal  Abdomen: soft, non-tender non-distented; bowel sounds normal; no masses,  no organomegaly    Laboratory  CBC: No results for input(s): WBC, RBC, HGB, HCT, PLT, MCV, MCH, MCHC in the last 168 hours.  CMP: No results for input(s): GLU, CALCIUM, ALBUMIN, PROT, NA, K, CO2, CL, BUN, CREATININE, ALKPHOS, ALT, AST, BILITOT in the last 168 hours.  Coagulation: No results for input(s): LABPROT, INR, APTT in the last 168 hours.      Diagnostic Results:      ASSESSMENT/PLAN:     Pancreas cyst    Plan: EUS    Anesthesia Plan: MAC    ASA Grade: ASA 2 - Patient with mild systemic disease with no functional limitations      The impression and plan was discussed in detail with the patient. All questions have been answered and the patient voices understanding of our plan at this point. The risk of the procedure was discussed in detail which includes but not limited to bleeding, infection, perforation in some cases requiring surgery with its spectrum of complications.

## 2017-08-04 NOTE — ANESTHESIA POSTPROCEDURE EVALUATION
"Anesthesia Post Evaluation    Patient: Cassidy Lordroux    Procedure(s) Performed: Procedure(s) (LRB):  ULTRASOUND-ENDOSCOPIC-UPPER (N/A)    Final Anesthesia Type: general  Patient location during evaluation: PACU  Patient participation: Yes- Able to Participate  Level of consciousness: awake and alert and oriented  Pain management: adequate  Airway patency: patent  PONV status at discharge: No PONV  Anesthetic complications: no      Cardiovascular status: blood pressure returned to baseline and hemodynamically stable  Respiratory status: unassisted  Hydration status: euvolemic  Follow-up not needed.        Visit Vitals  BP (!) 163/44 (BP Location: Left arm, Patient Position: Lying, BP Method: cNIBP)   Pulse 72   Temp 36.5 °C (97.7 °F) (Temporal)   Resp 19   Ht 5' 7" (1.702 m)   Wt 71.7 kg (158 lb)   SpO2 100%   Breastfeeding? No   BMI 24.75 kg/m²       Pain/Martin Score: Pain Assessment Performed: Yes (8/3/2017 10:00 AM)  Presence of Pain: denies (8/3/2017 10:00 AM)  Martin Score: 10 (8/3/2017 10:00 AM)      "

## 2017-08-10 ENCOUNTER — PATIENT OUTREACH (OUTPATIENT)
Dept: OTHER | Facility: OTHER | Age: 82
End: 2017-08-10

## 2017-08-10 NOTE — PROGRESS NOTES
Last 5 Patient Entered Redings Current 30 Day Average: 144/64     Recent Readings 8/9/2017 8/8/2017 8/7/2017 8/6/2017 8/5/2017    Systolic BP (mmHg) 140 161 142 125 150    Diastolic BP (mmHg) 59 71 65 63 66    Pulse 58 55 59 57 55        Going PCP on Tuesday for check up. Feeling well. Continuing low sodium diet and going for walks.  No questions or concerns.    Follow up with Ms. Cassidy Dahl completed. Patient is maintaining a low sodium diet and continuing her exercise regime. Patient did not have any further questions or concerns. I will follow up in a few weeks to see how she is doing and progressing.

## 2017-08-15 ENCOUNTER — OFFICE VISIT (OUTPATIENT)
Dept: INTERNAL MEDICINE | Facility: CLINIC | Age: 82
End: 2017-08-15
Attending: FAMILY MEDICINE
Payer: MEDICARE

## 2017-08-15 ENCOUNTER — HOSPITAL ENCOUNTER (OUTPATIENT)
Dept: RADIOLOGY | Facility: CLINIC | Age: 82
Discharge: HOME | End: 2017-08-15
Attending: FAMILY MEDICINE
Payer: MEDICARE

## 2017-08-15 VITALS
DIASTOLIC BLOOD PRESSURE: 52 MMHG | WEIGHT: 157.69 LBS | HEIGHT: 67 IN | SYSTOLIC BLOOD PRESSURE: 132 MMHG | BODY MASS INDEX: 24.75 KG/M2 | HEART RATE: 64 BPM

## 2017-08-15 DIAGNOSIS — N95.9 MENOPAUSAL AND PERIMENOPAUSAL DISORDER: ICD-10-CM

## 2017-08-15 DIAGNOSIS — I10 HYPERTENSION, ESSENTIAL: Primary | ICD-10-CM

## 2017-08-15 DIAGNOSIS — R41.89 COGNITIVE DECLINE: ICD-10-CM

## 2017-08-15 DIAGNOSIS — D35.02 ADRENAL ADENOMA, LEFT: ICD-10-CM

## 2017-08-15 DIAGNOSIS — E78.5 HYPERLIPIDEMIA, UNSPECIFIED HYPERLIPIDEMIA TYPE: ICD-10-CM

## 2017-08-15 PROCEDURE — 3075F SYST BP GE 130 - 139MM HG: CPT | Mod: ,,, | Performed by: FAMILY MEDICINE

## 2017-08-15 PROCEDURE — 99999 PR PBB SHADOW E&M-EST. PATIENT-LVL III: CPT | Mod: PBBFAC,,, | Performed by: FAMILY MEDICINE

## 2017-08-15 PROCEDURE — 1126F AMNT PAIN NOTED NONE PRSNT: CPT | Mod: ,,, | Performed by: FAMILY MEDICINE

## 2017-08-15 PROCEDURE — 99214 OFFICE O/P EST MOD 30 MIN: CPT | Mod: S$PBB,,, | Performed by: FAMILY MEDICINE

## 2017-08-15 PROCEDURE — 77080 DXA BONE DENSITY AXIAL: CPT | Mod: 26,,, | Performed by: INTERNAL MEDICINE

## 2017-08-15 PROCEDURE — 99213 OFFICE O/P EST LOW 20 MIN: CPT | Mod: PBBFAC,25 | Performed by: FAMILY MEDICINE

## 2017-08-15 PROCEDURE — 1159F MED LIST DOCD IN RCRD: CPT | Mod: ,,, | Performed by: FAMILY MEDICINE

## 2017-08-15 PROCEDURE — 77080 DXA BONE DENSITY AXIAL: CPT | Mod: TC

## 2017-08-15 PROCEDURE — 3078F DIAST BP <80 MM HG: CPT | Mod: ,,, | Performed by: FAMILY MEDICINE

## 2017-08-15 NOTE — MEDICAL/APP STUDENT
Subjective:       Patient ID: Cassidy Dahl is a 82 y.o. female.    Chief Complaint: Follow-up    Patient is an 82 year old female with pmhx of HTN HLD CAD 2 strokes, adrenal adenoma and depression and anxiety presenting for follow up after adjustment of BP medications with a new complaint of cognitive decline and fatigue.  Patients daughter in the room today endorses a decline in short term memory and cognitive function in her mother that started a year ago and was stable until this past may when it again worsened.  Patients fatigue has also worsened over the past year and daughter notes that cognitive function especially declines during these episodes. Patient also endorses a decline in balance over the past year also concurrent with multiple falls.       Review of Systems   Constitutional: Positive for activity change, appetite change and fatigue. Negative for chills, fever and unexpected weight change.   HENT: Positive for hearing loss. Negative for congestion, rhinorrhea, sinus pressure and trouble swallowing.    Eyes: Negative.    Respiratory: Negative for cough, choking and shortness of breath.    Cardiovascular: Negative for chest pain, palpitations and leg swelling.   Gastrointestinal: Negative for abdominal pain, anal bleeding, diarrhea, nausea and vomiting.   Endocrine: Negative.    Genitourinary: Negative for difficulty urinating, flank pain, hematuria and pelvic pain.   Musculoskeletal: Positive for arthralgias.   Skin: Negative.    Allergic/Immunologic: Negative.    Neurological: Positive for speech difficulty and weakness.   Psychiatric/Behavioral: Positive for confusion, decreased concentration and dysphoric mood. Negative for agitation. The patient is nervous/anxious.        Objective:      Physical Exam   Constitutional: She is oriented to person, place, and time. She appears well-developed and well-nourished.   HENT:   Head: Normocephalic and atraumatic.   Right Ear: External ear normal.   Left  Ear: External ear normal.   Nose: Nose normal.   Mouth/Throat: Oropharynx is clear and moist.   Eyes: Conjunctivae and EOM are normal. Pupils are equal, round, and reactive to light.   Neck: Normal range of motion. Neck supple.   Cardiovascular: Normal rate, regular rhythm, normal heart sounds and intact distal pulses.    Pulmonary/Chest: Effort normal and breath sounds normal.   Abdominal: Soft. Bowel sounds are normal.   Neurological: She is alert and oriented to person, place, and time.   Skin: Skin is warm and dry.   Psychiatric: Her speech is delayed. She is slowed. She exhibits abnormal recent memory.       Assessment:     -82 year old woman presenting for follow up   -HLD   -HTN   -Fatigue   -Depression Anxiety   -Fatigue   -Adrenal adenoma  Plan:   -TSH for fatigue  -lipids from 3/24 wnl (slightly high trig))-->recheck today  -CMP--> recheck  -ACTH stim?  -Continue statin patient is tolerating  -HTN-->related to adrenal adenoma and could be contributing to fatigue

## 2017-08-15 NOTE — PROGRESS NOTES
Subjective:       Patient ID: Cassidy Dahl is a 82 y.o. female.    Chief Complaint: Follow-up    HPI  Review of Systems   Constitutional: Positive for activity change and fatigue. Negative for chills and fever.   HENT: Negative for congestion and trouble swallowing.    Eyes: Negative for redness.   Respiratory: Negative for cough, chest tightness and shortness of breath.    Cardiovascular: Positive for palpitations. Negative for chest pain and leg swelling.   Gastrointestinal: Negative for abdominal pain and blood in stool.   Genitourinary: Negative for hematuria and menstrual problem.   Musculoskeletal: Negative for arthralgias, back pain, gait problem, joint swelling, myalgias and neck pain.   Skin: Negative for color change and rash.   Neurological: Negative for tremors, speech difficulty, weakness, numbness and headaches.   Hematological: Negative for adenopathy. Does not bruise/bleed easily.   Psychiatric/Behavioral: Positive for decreased concentration, dysphoric mood and sleep disturbance. Negative for behavioral problems and confusion. The patient is not nervous/anxious.        Objective:      Physical Exam   Constitutional: She is oriented to person, place, and time. She appears well-developed and well-nourished. No distress.   Eyes: No scleral icterus.   Neck: Normal range of motion. Neck supple.   Cardiovascular: Normal rate, normal heart sounds and intact distal pulses.  Exam reveals no gallop and no friction rub.    No murmur heard.  Pulmonary/Chest: Effort normal and breath sounds normal. No respiratory distress. She has no wheezes. She has no rales.   Abdominal: Soft. Bowel sounds are normal. She exhibits no abdominal bruit.   Musculoskeletal: She exhibits no edema.        Right lower leg: She exhibits no edema.        Left lower leg: She exhibits no edema.   Neurological: She is alert and oriented to person, place, and time. She displays no tremor. No cranial nerve deficit. Coordination and gait  normal.   Skin: Skin is warm and dry. No rash noted. She is not diaphoretic. No erythema.   Psychiatric: She has a normal mood and affect. Her behavior is normal. Judgment and thought content normal.   Nursing note and vitals reviewed.      Assessment:       1. Hypertension, essential    2. Cognitive decline    3. Adrenal adenoma, left    4. Hyperlipidemia, unspecified hyperlipidemia type        Plan:   Cassidy was seen today for follow-up.    Diagnoses and all orders for this visit:    Hypertension, essential    Cognitive decline  -     CBC Without Differential; Future  -     Comprehensive metabolic panel; Future  -     TSH; Future  -     Ambulatory referral to Neurology    Adrenal adenoma, left  Comments:  chart reviewed, cleared by Dr. Hurd and recent CT no change - no further w/u recommended    Hyperlipidemia, unspecified hyperlipidemia type  -     TSH; Future  -     Lipid panel; Future      See meds, orders, follow up, routing and instructions sections of encounter.  An 82-year-old established female patient.  She is in for a followup for blood   pressure, but presents with daughter and multiple complaints.  Daughter seems   concerned about her mental status, anxiety.  The patient does not share those   concerns.  The patient and daughter, however, both seemed to think she is having   some cognitive decline.    She had an adrenal adenoma worked up by Dr. Gandara.  He recommended that we   check a CT in a year that was recently unchanged and her prior metabolic workup   was reviewed by him stating she needs no further followups.    She lives with her son, Rafael Lepe.  She had been on antidepressants in the   past.  The Effexor taken off a year or two ago, consideration will be to renew   something on the order of Zoloft that adhere to Beers criteria.  I do recommend   some additional blood work at this time with a Neurology consult and follow up   in approximately two months with me.      MARIIA/HN  dd:  08/15/2017 15:05:02 (MAYO)  td: 08/16/2017 02:17:23 (MAYO)  Doc ID   #[id number]  Job ID #[id number]    CC:

## 2017-08-25 ENCOUNTER — PATIENT OUTREACH (OUTPATIENT)
Dept: OTHER | Facility: OTHER | Age: 82
End: 2017-08-25

## 2017-08-25 DIAGNOSIS — I10 ESSENTIAL HYPERTENSION: Primary | ICD-10-CM

## 2017-08-25 RX ORDER — VALSARTAN AND HYDROCHLOROTHIAZIDE 320; 12.5 MG/1; MG/1
1 TABLET, FILM COATED ORAL DAILY
Qty: 90 TABLET | Refills: 3 | Status: SHIPPED | OUTPATIENT
Start: 2017-08-25 | End: 2017-12-27

## 2017-08-25 NOTE — PROGRESS NOTES
"Last 5 Patient Entered RednVoq Current 30 Day Average: 145/67     Recent Readings 8/25/2017 8/25/2017 8/24/2017 8/23/2017 8/22/2017    Systolic BP (mmHg) 158 169 129 156 153    Diastolic BP (mmHg) 69 66 55 67 73    Pulse 52 58 62 66 65        Called patient to introduce her into the Hypertension Digital Medicine Program.     Ms. Dahl's BP is slightly above goal. She was previously on valsartan/HCTZ, but it was discontinued due to complaints of cough. She did say the cough improved on losartan/HCTZ, but she "feels BP was better on valsartan/HCTZ." Will change losartan/HCTZ to valsartan/HCTZ. Asked that she let me know if cough returns. She will be getting prescription from mail order, so will reassess BP readings in about 3 weeks.     Reviewed patient's medications and verified allergies on file.   Hypertension Medications             amlodipine (NORVASC) 5 MG tablet TAKE 1 TABLET NIGHTLY    metoprolol succinate (TOPROL-XL) 50 MG 24 hr tablet Take 1 tablet (50 mg total) by mouth once daily.    valsartan-hydrochlorothiazide (DIOVAN-HCT) 320-12.5 mg per tablet Take 1 tablet by mouth once daily.        Explained that we expect her to obtain several blood pressures/week at random times of day. Also asked that the BP be taken at least 1 hour after taking BP medications.     Explained that our goal is to get her BP to consistently below 140/90mmHg.     Patient and I agreed that the patient will take her BP daily to every other day at varying times of the day.     Emailed patient link to [facility] HTN webpage as well as my direct phone number in case she has in questions.       "

## 2017-08-28 ENCOUNTER — TELEPHONE (OUTPATIENT)
Dept: INTERNAL MEDICINE | Facility: CLINIC | Age: 82
End: 2017-08-28

## 2017-08-28 DIAGNOSIS — I10 HYPERTENSION, ESSENTIAL: Primary | ICD-10-CM

## 2017-08-28 NOTE — TELEPHONE ENCOUNTER
Pt states that [facility] informed her that further information is needed from the physician in order for medication change to take effect from losartan to diovan. Pt did not state what further information was needed, as she was unsure. please advise.    Thank you.

## 2017-08-28 NOTE — TELEPHONE ENCOUNTER
----- Message from Susanne Christian sent at 8/28/2017  9:52 AM CDT -----  Contact: Self/ [phone]   Pt wanted to speak with someone in the office about changing her blood pressure medication. Pt stated she receive the call from the pharmacy. Please call and advise     Thank you

## 2017-08-28 NOTE — TELEPHONE ENCOUNTER
Beatrice, This medication change was made by the Pharm D with the digital HTN program on 8/25. Patient will need a PA for valsartan/HCT since losartan was not working. Please initiate a P A , thank you.

## 2017-08-31 NOTE — TELEPHONE ENCOUNTER
Spoke with Nidia @ [email or web address] in regards to insuring that pt is able to receive her medication. I have been informed that the pt needed a drug utilization review and that this was the hold up. I had to inform [email or web address] that pt would no longer be taking Losartan, and that the Valsartan HCT replaced the Losartan. Pat has been notified and verbalized understanding that Valsartan HCT is new med. Valsartan HCT will be mailed to pt's address. Reference number for call: [id number]

## 2017-08-31 NOTE — TELEPHONE ENCOUNTER
PA has been initiated. Here is the response from [facility]:    VERA FOURROUX Key: [id number] Need help? Call us at [phone]   Outcome   Additional Information Required   A Prior Authorization is not applicable for this patient/medication. No further PA action needed.

## 2017-09-07 ENCOUNTER — PATIENT OUTREACH (OUTPATIENT)
Dept: OTHER | Facility: OTHER | Age: 82
End: 2017-09-07

## 2017-09-07 NOTE — PROGRESS NOTES
Last 5 Patient Entered Redings Current 30 Day Average: 151/69     Recent Readings 9/7/2017 9/6/2017 9/5/2017 9/4/2017 9/3/2017    Systolic BP (mmHg) 155 151 164 133 157    Diastolic BP (mmHg) 77 70 66 70 76    Pulse 60 63 57 68 59        Hypertension Digital Medicine Program (HDMP): Health  Follow Up    Feeling well. New valsartan/hctz hasn't arrived yet. Should arrive on Friday and will make the switch when it comes in.    Lifestyle Modifications:    1. Low sodium diet: yes    2.Physical activity: yes - walking    Follow up with Ms. Cassidy Dahl completed. No further questions or concerns. I will follow up in a few weeks to assess progress.

## 2017-09-12 ENCOUNTER — TELEPHONE (OUTPATIENT)
Dept: INTERNAL MEDICINE | Facility: CLINIC | Age: 82
End: 2017-09-12

## 2017-09-14 ENCOUNTER — PATIENT OUTREACH (OUTPATIENT)
Dept: OTHER | Facility: OTHER | Age: 82
End: 2017-09-14

## 2017-09-14 NOTE — PROGRESS NOTES
Last 5 Patient Entered Redings Current 30 Day Average: 153/70     Recent Readings 9/20/2017 9/20/2017 9/20/2017 9/19/2017 9/18/2017    Systolic BP (mmHg) 158 173 170 140 142    Diastolic BP (mmHg) 73 79 68 66 61    Pulse 56 60 59 61 64        MsAryan Dahl's BP average has increased since our last encounter. She started valsartan/HCTZ about 4-5 days ago. Readings from Monday and Tuesday were improved, but then her readings this morning spiked. She is unsure if she took valsartan/HCTZ last night as she started a new pill box and may have left out the valsartan/HCTZ. She will be sure to take all medications tonight. She has no side effects or complaints since starting valsartan/HCTZ.     Current HTN regimen:  Hypertension Medications             amlodipine (NORVASC) 5 MG tablet TAKE 1 TABLET NIGHTLY    TOPROL XL 50 mg 24 hr tablet TAKE 1 TABLET DAILY    valsartan-hydrochlorothiazide (DIOVAN-HCT) 320-12.5 mg per tablet Take 1 tablet by mouth once daily.        Will continue to monitor regularly. Will follow up in 1-2 months, sooner if BP begins to trend upward or downward.    Patient has my contact information and knows to call with any concerns or clinical changes.

## 2017-09-19 RX ORDER — METOPROLOL SUCCINATE 50 MG/1
TABLET, EXTENDED RELEASE ORAL
Qty: 90 TABLET | Refills: 3 | Status: SHIPPED | OUTPATIENT
Start: 2017-09-19 | End: 2017-12-27

## 2017-10-05 ENCOUNTER — PATIENT OUTREACH (OUTPATIENT)
Dept: OTHER | Facility: OTHER | Age: 82
End: 2017-10-05

## 2017-10-05 NOTE — PROGRESS NOTES
"Last 5 Patient Entered Redings Current 30 Day Average: 148/69     Recent Readings 10/5/2017 10/4/2017 10/3/2017 10/2/2017 10/1/2017    Systolic BP (mmHg) 130 152 133 143 156    Diastolic BP (mmHg) 67 72 66 67 64    Pulse 58 59 58 59 59        Hypertension Digital Medicine Program (HDMP): Health  Follow Up    Feeling well.  Thinks the new BP meds are starting to work. Has been on them for about a week "maybe a week and a half".    Lifestyle Modifications:    1.Low sodium diet: yes -  States she's always followed a low sodium diet but will have pizza now and again.    2.Physical activity: yes - staying active, walking    Follow up with Ms. Cassidy Dahl completed. No further questions or concerns. I will follow up in a few weeks to assess progress.       "

## 2017-10-11 ENCOUNTER — PATIENT OUTREACH (OUTPATIENT)
Dept: OTHER | Facility: OTHER | Age: 82
End: 2017-10-11

## 2017-10-11 RX ORDER — AMLODIPINE BESYLATE 5 MG/1
TABLET ORAL
Qty: 90 TABLET | Refills: 0 | Status: SHIPPED | OUTPATIENT
Start: 2017-10-11 | End: 2018-01-08 | Stop reason: SDUPTHER

## 2017-10-11 NOTE — PROGRESS NOTES
Last 5 Patient Entered Redings Current 30 Day Average: 145/69     Recent Readings 10/10/2017 10/9/2017 10/8/2017 10/7/2017 10/6/2017    Systolic BP (mmHg) 158 130 153 109 151    Diastolic BP (mmHg) 74 73 57 65 71    Pulse 56 61 52 63 56        MsAryan Dahl's BP average is improving now that she has switched from losartan/HCTZ to valsartan/HCTZ. She does seem to have a higher reading every other day. She cannot identify a reason for this trend. She does confirm that she is taking all of her medications as prescribed and is not skipping any doses or days. She denies any side effects or symptoms of hypotension. She says her daughter is having her drink Boost everyday for added protein and she has been feeling better since starting this. She does eat pizza every week or every 2 weeks, so asked that she continue to limit it as much as possible.     Current HTN regimen:  Hypertension Medications             amlodipine (NORVASC) 5 MG tablet TAKE 1 TABLET NIGHTLY    TOPROL XL 50 mg 24 hr tablet TAKE 1 TABLET DAILY    valsartan-hydrochlorothiazide (DIOVAN-HCT) 320-12.5 mg per tablet Take 1 tablet by mouth once daily.        Will continue to monitor regularly. Will follow up in 3-4 weeks, sooner if BP begins to trend upward or downward.    Patient has my contact information and knows to call with any concerns or clinical changes.

## 2017-10-20 ENCOUNTER — OFFICE VISIT (OUTPATIENT)
Dept: INTERNAL MEDICINE | Facility: CLINIC | Age: 82
End: 2017-10-20
Attending: FAMILY MEDICINE
Payer: MEDICARE

## 2017-10-20 VITALS
SYSTOLIC BLOOD PRESSURE: 150 MMHG | BODY MASS INDEX: 24.17 KG/M2 | OXYGEN SATURATION: 97 % | WEIGHT: 154 LBS | DIASTOLIC BLOOD PRESSURE: 50 MMHG | HEIGHT: 67 IN | HEART RATE: 55 BPM

## 2017-10-20 DIAGNOSIS — E78.5 HYPERLIPIDEMIA, UNSPECIFIED HYPERLIPIDEMIA TYPE: ICD-10-CM

## 2017-10-20 DIAGNOSIS — I10 HYPERTENSION, ESSENTIAL: Primary | ICD-10-CM

## 2017-10-20 PROCEDURE — 99213 OFFICE O/P EST LOW 20 MIN: CPT | Mod: PBBFAC | Performed by: FAMILY MEDICINE

## 2017-10-20 PROCEDURE — 99213 OFFICE O/P EST LOW 20 MIN: CPT | Mod: S$PBB,,, | Performed by: FAMILY MEDICINE

## 2017-10-20 PROCEDURE — 99999 PR PBB SHADOW E&M-EST. PATIENT-LVL III: CPT | Mod: PBBFAC,,, | Performed by: FAMILY MEDICINE

## 2017-10-20 NOTE — PROGRESS NOTES
Subjective:       Patient ID: Cassidy Dahl is a 83 y.o. female.    Chief Complaint: Follow-up (2 months f/u)    HPI  Review of Systems   Constitutional: Negative for chills, fatigue and fever.   HENT: Negative for congestion and trouble swallowing.    Eyes: Negative for redness.   Respiratory: Negative for cough, chest tightness and shortness of breath.    Cardiovascular: Negative for chest pain, palpitations and leg swelling.   Gastrointestinal: Negative for abdominal pain and blood in stool.   Genitourinary: Negative for hematuria and menstrual problem.   Musculoskeletal: Negative for arthralgias, back pain, gait problem, joint swelling, myalgias and neck pain.   Skin: Negative for color change and rash.   Neurological: Negative for tremors, speech difficulty, weakness, numbness and headaches.   Hematological: Negative for adenopathy. Does not bruise/bleed easily.   Psychiatric/Behavioral: Negative for behavioral problems, confusion and sleep disturbance. The patient is not nervous/anxious.        Objective:      Physical Exam   Constitutional: She is oriented to person, place, and time. She appears well-developed and well-nourished. No distress.   Neck: Neck supple.   Pulmonary/Chest: Effort normal.   Musculoskeletal: She exhibits no edema.        Right lower leg: She exhibits no edema.        Left lower leg: She exhibits no edema.   Neurological: She is alert and oriented to person, place, and time.   Skin: Skin is warm and dry. No rash noted.   Psychiatric: She has a normal mood and affect. Her behavior is normal. Judgment and thought content normal.   Nursing note and vitals reviewed.      Assessment:       1. Hypertension, essential    2. Hyperlipidemia, unspecified hyperlipidemia type        Plan:   Cassidy was seen today for follow-up.    Diagnoses and all orders for this visit:    Hypertension, essential    Hyperlipidemia, unspecified hyperlipidemia type      See meds, orders, follow up, routing and  instructions sections of encounter.  This patient is seen for a followup.  She has not seen the neurologist at this   time.  She presents today alone and states she does not want to go to the   neurologist for any mental status changes, dizziness, etc.  Her blood pressure   is elevated.  I suggested some changes to her diet.  No medication changes at   this time.  Follow up in three to four months.      MARIIA/CRISS  dd: 10/20/2017 13:14:37 (CDT)  td: 10/21/2017 10:01:06 (CDT)  Doc ID   #[id number]  Job ID #[id number]    CC:

## 2017-10-30 RX ORDER — OMEPRAZOLE 20 MG/1
CAPSULE, DELAYED RELEASE ORAL
Qty: 90 CAPSULE | Refills: 0 | Status: SHIPPED | OUTPATIENT
Start: 2017-10-30 | End: 2018-01-28 | Stop reason: SDUPTHER

## 2017-11-02 ENCOUNTER — PATIENT OUTREACH (OUTPATIENT)
Dept: OTHER | Facility: OTHER | Age: 82
End: 2017-11-02

## 2017-11-02 NOTE — PROGRESS NOTES
Last 5 Patient Entered Readings Current 30 Day Average: 140/65     Recent Readings 11/2/2017 11/2/2017 11/1/2017 10/31/2017 10/30/2017    Systolic BP (mmHg) 134 126 135 142 145    Diastolic BP (mmHg) 65 59 64 64 67    Pulse 55 57 61 56 55        Hypertension Digital Medicine Program (HDMP): Health  Follow Up    Feeling well.  Thinks the Diovan is working well. BP readings seem to be coming down.    Lifestyle Modifications:    1.Low sodium diet: yes added Boost and protein water, thinks it has helped her energy levels.    2.Physical activity: yes walking     3.Hypotension/Hypertension symptoms: no   Frequency/Alleviating factors/Precipitating factors, etc.     4.Patient has been compliant with the medication regimen.     Follow up with Ms. Cassidy Dahl completed. No further questions or concerns. I will follow up in a few weeks to assess progress.

## 2017-11-09 ENCOUNTER — PATIENT OUTREACH (OUTPATIENT)
Dept: OTHER | Facility: OTHER | Age: 82
End: 2017-11-09

## 2017-11-09 NOTE — PROGRESS NOTES
Last 5 Patient Entered Readings Current 30 Day Average: 139/64     Recent Readings 11/9/2017 11/8/2017 11/7/2017 11/6/2017 11/4/2017    Systolic BP (mmHg) 128 144 127 109 127    Diastolic BP (mmHg) 58 73 64 53 72    Pulse 60 60 58 57 60        Ms. Nabila's BP average is controlled. Her readings are improved and more stable on valsartan/HCTZ vs losartan/HCTZ. She is pleased with her current BP readings and has no questions or concerns.     Current HTN regimen:  Hypertension Medications             amlodipine (NORVASC) 5 MG tablet TAKE 1 TABLET NIGHTLY    TOPROL XL 50 mg 24 hr tablet TAKE 1 TABLET DAILY    valsartan-hydrochlorothiazide (DIOVAN-HCT) 320-12.5 mg per tablet Take 1 tablet by mouth once daily.        Will continue to monitor regularly. Will follow up in 1-2 months, sooner if BP begins to trend upward or downward.    Patient has my contact information and knows to call with any concerns or clinical changes.

## 2017-12-04 ENCOUNTER — PATIENT OUTREACH (OUTPATIENT)
Dept: OTHER | Facility: OTHER | Age: 82
End: 2017-12-04

## 2017-12-04 NOTE — PROGRESS NOTES
"Last 5 Patient Entered Readings Current 30 Day Average: 139/62     Recent Readings 12/4/2017 12/3/2017 12/2/2017 12/1/2017 11/28/2017    Systolic BP (mmHg) 163 166 146 165 130    Diastolic BP (mmHg) 60 65 65 64 62    Pulse 28 30 59 53 56        Hypertension Digital Medicine Program (HDMP): Health  Follow Up    Ms Dahl is concerned with her recent HR readings but thinks her BP "looks pretty good". I advised her to take another reading and call her doctor as a precaution. She stated she was feeling "winded" and walking around the grocery store was difficult yesterday.     Lifestyle Modifications:    Will discuss on next call. Not main concern today.    Follow up with Ms. Cassidy Dahl completed. No further questions or concerns. I will follow up in a few weeks to assess progress.       "

## 2017-12-04 NOTE — PROGRESS NOTES
Last 5 Patient Entered Readings Current 30 Day Average: 139/62     Recent Readings 12/4/2017 12/3/2017 12/2/2017 12/1/2017 11/28/2017    Systolic BP (mmHg) 163 166 146 165 130    Diastolic BP (mmHg) 60 65 65 64 62    Pulse 28 30 59 53 56        Ms. Dahl's health , Chandni Biggs, informed me that the patient's HR was 28 BPM today and has been dropping into the 30s over the past few weeks. Called Ms. Dahl to assess clinical status. She says she gets SOB with exertion, but has not been doing much activity. She denies feeling faint, LH/dizzy. She says she is weak but that this is nothing new for her. Advised she go to the ED, but she does not feel this is necessary. Explained that her HR should not be this low and she should be evaluated as soon as possible, even if she walks in to urgent care or a local clinic in [place], [place].     Ms. Dahl takes all her medications at night, so advised at this time that she hold metoprolol 50 mg XL until given any other instructions.     Will follow up with Ms. Dahl tomorrow.

## 2017-12-06 ENCOUNTER — PATIENT OUTREACH (OUTPATIENT)
Dept: OTHER | Facility: OTHER | Age: 82
End: 2017-12-06

## 2017-12-06 NOTE — PROGRESS NOTES
Last 5 Patient Entered Readings Current 30 Day Average: 141/61     Recent Readings 12/5/2017 12/5/2017 12/4/2017 12/3/2017 12/2/2017    Systolic BP (mmHg) 168 143 163 166 146    Diastolic BP (mmHg) 57 76 60 65 65    Pulse 32 32 28 30 59        Ms. Dahl went to the ED per my instructions on Monday. She is now admitted at [facility] for bradycardia and ADHF. She said her HR remains in the 30s while she is there and they are removing fluid with diuretics. Will follow up in a few weeks.     Current HTN regimen:  Hypertension Medications             amlodipine (NORVASC) 5 MG tablet TAKE 1 TABLET NIGHTLY    TOPROL XL 50 mg 24 hr tablet TAKE 1 TABLET DAILY    valsartan-hydrochlorothiazide (DIOVAN-HCT) 320-12.5 mg per tablet Take 1 tablet by mouth once daily.          Will continue to monitor regularly. Will follow up in 2-3 weeks, sooner if BP begins to trend upward or downward.    Patient has my contact information and knows to call with any concerns or clinical changes.

## 2017-12-11 RX ORDER — PRAVASTATIN SODIUM 40 MG/1
40 TABLET ORAL NIGHTLY
Qty: 90 TABLET | Refills: 3 | Status: SHIPPED | OUTPATIENT
Start: 2017-12-11

## 2017-12-27 ENCOUNTER — PATIENT OUTREACH (OUTPATIENT)
Dept: OTHER | Facility: OTHER | Age: 82
End: 2017-12-27

## 2017-12-27 RX ORDER — FUROSEMIDE 40 MG/1
40 TABLET ORAL DAILY
COMMUNITY

## 2017-12-27 RX ORDER — LANOLIN ALCOHOL/MO/W.PET/CERES
400 CREAM (GRAM) TOPICAL
COMMUNITY

## 2017-12-27 RX ORDER — VALSARTAN 320 MG/1
320 TABLET ORAL DAILY
COMMUNITY

## 2017-12-27 RX ORDER — POTASSIUM CHLORIDE 600 MG/1
8 TABLET, FILM COATED, EXTENDED RELEASE ORAL ONCE
COMMUNITY

## 2017-12-27 NOTE — PROGRESS NOTES
Last 5 Patient Entered Readings Current 30 Day Average: 151/65     Recent Readings 12/27/2017 12/26/2017 12/25/2017 12/24/2017 12/24/2017    SBP (mmHg) 143 148 138 201 194    DBP (mmHg) 66 66 61 71 67    Pulse 81 76 86 80 79        Dr. He at [facility]     Patient's BP average is above goal of <130/80.     Ms. Dahl's BP has been higher since being discharged from [facility]. Dr. He is her cardiologist there and due to bradycardia, he discontinued metoprolol. She is currently not on a beta blocker, and her BP has been much higher. She said she was given a medication while inpatient and had a reaction, but is unsure of the medication. Discussed adding carvedilol instead of metoprolol, but she says she thinks that may have been the medication that caused the reaction. She said her face turned very red and flush. Dr. He discussed a pacemake for Ms. Dahl, but she said a decision has not been made. She sees him for follow up next week. Medication list has been updated. Asked that she confirm which medication caused flushing reaction so we can add it to her allergies in Epic.     Patient denies s/s of hypotension (lightheadedness, dizziness, nausea, fatigue) associated with low readings. Instructed patient to inform me if this occurs, patient confirms understanding.      Patient denies s/s of hypertension (SOB, CP, severe headaches, changes in vision) associated with high readings. Instructed patient to go to the ED if BP > 180/110 and accompanied by hypertensive s/s, patient confirms understanding.    Will continue to monitor regularly. Will follow up in 2-3 weeks, sooner if BP begins to trend upward or downward.    Patient has my contact information and knows to call with any concerns or clinical changes.     Current HTN regimen:  Hypertension Medications             furosemide (LASIX) 40 MG tablet Take 40 mg by mouth once daily.    valsartan (DIOVAN) 320 MG tablet Take 320 mg by mouth once  daily.    amlodipine (NORVASC) 5 MG tablet TAKE 1 TABLET NIGHTLY

## 2017-12-29 ENCOUNTER — PATIENT OUTREACH (OUTPATIENT)
Dept: OTHER | Facility: OTHER | Age: 82
End: 2017-12-29

## 2017-12-29 NOTE — PROGRESS NOTES
Last 5 Patient Entered Readings Current 30 Day Average: 152/65     Recent Readings 12/29/2017 12/28/2017 12/27/2017 12/26/2017 12/25/2017    SBP (mmHg) 159 153 143 148 138    DBP (mmHg) 74 67 66 66 61    Pulse 76 72 81 76 86        Hypertension Digital Medicine (HDMP) Health  Follow Up    LVM to follow up with Ms. Cassidy AYALA Dahl.    Per 30 day average, blood pressure is not well controlled 152/65 mmHg. Encouraged adherence to low sodium diet and physical activity guidelines. Advised patient to call or message with questions or concerns. WCB in 3 weeks.

## 2018-01-01 ENCOUNTER — PATIENT MESSAGE (OUTPATIENT)
Dept: ADMINISTRATIVE | Facility: OTHER | Age: 83
End: 2018-01-01

## 2018-01-08 RX ORDER — AMLODIPINE BESYLATE 5 MG/1
5 TABLET ORAL NIGHTLY
Qty: 90 TABLET | Refills: 0 | Status: SHIPPED | OUTPATIENT
Start: 2018-01-08 | End: 2018-02-22 | Stop reason: ALTCHOICE

## 2018-01-08 RX ORDER — NAPROXEN SODIUM 220 MG/1
81 TABLET, FILM COATED ORAL NIGHTLY
COMMUNITY
Start: 2018-01-08

## 2018-01-11 ENCOUNTER — PATIENT OUTREACH (OUTPATIENT)
Dept: OTHER | Facility: OTHER | Age: 83
End: 2018-01-11

## 2018-01-11 NOTE — PROGRESS NOTES
Last 5 Patient Entered Readings                                      Current 30 Day Average: 149/67     Recent Readings 1/10/2018 1/9/2018 1/8/2018 1/7/2018 1/6/2018    SBP (mmHg) 145 139 156 156 148    DBP (mmHg) 69 63 69 74 75    Pulse 95 97 77 81 80        Ms. Dahl is currently admitted to an outside facility. She had a pacemaker placed yesterday and is still in the hospital. Will follow up next week to discuss medication changes made during her hospitalization.     Patient has my contact information and knows to call with any concerns or clinical changes.     Current HTN regimen:  Hypertension Medications             amLODIPine (NORVASC) 5 MG tablet Take 1 tablet (5 mg total) by mouth nightly.    furosemide (LASIX) 40 MG tablet Take 40 mg by mouth once daily.    valsartan (DIOVAN) 320 MG tablet Take 320 mg by mouth once daily.

## 2018-01-19 ENCOUNTER — PATIENT OUTREACH (OUTPATIENT)
Dept: OTHER | Facility: OTHER | Age: 83
End: 2018-01-19

## 2018-01-19 RX ORDER — METOPROLOL SUCCINATE 25 MG/1
25 TABLET, EXTENDED RELEASE ORAL DAILY
COMMUNITY
End: 2018-02-22 | Stop reason: ALTCHOICE

## 2018-01-19 NOTE — PROGRESS NOTES
Last 5 Patient Entered Readings                                      Current 30 Day Average: 149/68     Recent Readings 1/19/2018 1/16/2018 1/15/2018 1/14/2018 1/13/2018    SBP (mmHg) 159 164 152 144 153    DBP (mmHg) 72 68 66 64 65    Pulse 66 71 68 69 81        Patient's BP average is above goal of <130/80.     Ms. Dahl had a pacemaker placed and says she is feeling much better. Her HR has increased to the 60-70s. She is no longer having SOB or feeling weak. She reviewed her updated medication list with me and I have updated her chart. Reminded her to monitor salt intake as BP has increased over the past week. Will not make any medication adjustments at this time, but will monitor closely.     Patient denies s/s of hypotension (lightheadedness, dizziness, nausea, fatigue) associated with low readings. Instructed patient to inform me if this occurs, patient confirms understanding.      Patient denies s/s of hypertension (SOB, CP, severe headaches, changes in vision) associated with high readings. Instructed patient to go to the ED if BP > 180/110 and accompanied by hypertensive s/s, patient confirms understanding.    Will continue to monitor regularly. Will follow up in 2-3 weeks, sooner if BP begins to trend upward or downward.     Patient has my contact information and knows to call with any concerns or clinical changes.     Current HTN regimen:  Hypertension Medications             metoprolol succinate (TOPROL-XL) 25 MG 24 hr tablet Take 25 mg by mouth once daily.    amLODIPine (NORVASC) 5 MG tablet Take 1 tablet (5 mg total) by mouth nightly.    furosemide (LASIX) 40 MG tablet Take 40 mg by mouth once daily.    valsartan (DIOVAN) 320 MG tablet Take 320 mg by mouth once daily.

## 2018-01-22 NOTE — PROGRESS NOTES
"Last 5 Patient Entered Readings                                      Current 30 Day Average: 150/68     Recent Readings 1/22/2018 1/21/2018 1/19/2018 1/16/2018 1/15/2018    SBP (mmHg) 153 149 159 164 152    DBP (mmHg) 63 65 72 68 66    Pulse 66 70 66 71 68        Hypertension Digital Medicine Program (HDMP): Health  Follow Up    Doing well. Since we last spoke she's had a pace maker put in and she thinks it's great.   "Nothing but good things to report".    Lifestyle Modifications:    1.Low sodium diet: yes - continuing to monitor sodium intake.    2.Physical activity: yes - staying as active as possible.    3.Hypotension/Hypertension symptoms: no   Frequency/Alleviating factors/Precipitating factors, etc.     4.Patient has been compliant with the medication regimen.     Follow up with Ms. Cassidy Dahl completed. No further questions or concerns. I will follow up in a few weeks to assess progress.         "

## 2018-01-28 RX ORDER — OMEPRAZOLE 20 MG/1
CAPSULE, DELAYED RELEASE ORAL
Qty: 90 CAPSULE | Refills: 0 | Status: SHIPPED | OUTPATIENT
Start: 2018-01-28 | End: 2018-07-01 | Stop reason: SDUPTHER

## 2018-02-16 ENCOUNTER — PATIENT OUTREACH (OUTPATIENT)
Dept: OTHER | Facility: OTHER | Age: 83
End: 2018-02-16

## 2018-02-16 NOTE — PROGRESS NOTES
Last 5 Patient Entered Readings                                      Current 30 Day Average: 155/67     Recent Readings 2/16/2018 2/15/2018 2/15/2018 2/14/2018 2/13/2018    SBP (mmHg) 147 131 163 144 158    DBP (mmHg) 64 57 71 57 73    Pulse 82 76 69 95 73        Hypertension Digital Medicine (HDMP) Health  Follow Up    LVM to follow up with Ms. Cassidy AYALA Dahl.    Per 30 day average, blood pressure is not well controlled 155/67 mmHg. Encouraged adherence to low sodium diet and physical activity guidelines. Advised patient to call or message with questions or concerns. WCB in 2 weeks.

## 2018-02-22 ENCOUNTER — PATIENT OUTREACH (OUTPATIENT)
Dept: OTHER | Facility: OTHER | Age: 83
End: 2018-02-22

## 2018-02-22 RX ORDER — DILTIAZEM HYDROCHLORIDE 30 MG/1
30 TABLET, FILM COATED ORAL 2 TIMES DAILY
COMMUNITY

## 2018-02-22 NOTE — PROGRESS NOTES
"Last 5 Patient Entered Readings                                      Current 30 Day Average: 154/66     Recent Readings 2/22/2018 2/21/2018 2/20/2018 2/19/2018 2/17/2018    SBP (mmHg) 157 173 136 159 119    DBP (mmHg) 71 67 58 67 62    Pulse 79 82 86 79 78        Patient's BP average is above goal of <130/80.      Ms. Flynn's BP has increased since our last encounter. She has been checking her BP prior to her morning medications; asked that she wait 1 hour after her morning medications prior to checking her BP. She is no longer on amlodipine and metoprolol. She asked her cardiologist to stop the metoprolol because he made her feel "like she was in a fog." She says she had to think longer to answer questions and felt like it aged her. She is now on diltiazem and reports feeling much better. She will be reporting BP readings to cardiology at the end of the week.     Patient denies s/s of hypotension (lightheadedness, dizziness, nausea, fatigue) associated with low readings. Instructed patient to inform me if this occurs, patient confirms understanding.      Patient denies s/s of hypertension (SOB, CP, severe headaches, changes in vision) associated with high readings. Instructed patient to go to the ED if BP > 180/110 and accompanied by hypertensive s/s, patient confirms understanding.    Will continue to monitor regularly. Will follow up in 4-6 weeks, sooner if BP begins to trend upward or downward.    Patient has my contact information and knows to call with any concerns or clinical changes.     Current HTN regimen:  Hypertension Medications             diltiaZEM (CARDIZEM) 30 MG tablet Take 30 mg by mouth 2 (two) times daily.    furosemide (LASIX) 40 MG tablet Take 40 mg by mouth once daily.    valsartan (DIOVAN) 320 MG tablet Take 320 mg by mouth once daily.              "

## 2018-03-02 ENCOUNTER — PATIENT OUTREACH (OUTPATIENT)
Dept: OTHER | Facility: OTHER | Age: 83
End: 2018-03-02

## 2018-03-02 NOTE — PROGRESS NOTES
Last 5 Patient Entered Readings                                      Current 30 Day Average: 153/66     Recent Readings 3/1/2018 2/28/2018 2/28/2018 2/28/2018 2/27/2018    SBP (mmHg) 151 157 179 164 150    DBP (mmHg) 63 66 73 69 73    Pulse 78 76 79 87 83        Hypertension Digital Medicine Program (HDMP): Health  Follow Up    Only taking 1/2 Cardizem now at night because she felt the full dose was too strong.  Currently at the doctor's office for an eye exam.    Lifestyle Modifications:    1.Low sodium diet: yes - continuing to monitor sodium intake with the help of her daughter.    2.Physical activity: minimal walking because she gets SOB quickly, even just walking from her chair to somewhere else in the room.    3.Hypotension/Hypertension symptoms: no   Frequency/Alleviating factors/Precipitating factors, etc.     4.Patient has been compliant with the medication regimen.     Follow up with Ms. Cassidy Dahl completed. No further questions or concerns. I will follow up in a few weeks to assess progress.

## 2018-03-02 NOTE — PROGRESS NOTES
"Last 5 Patient Entered Readings                                      Current 30 Day Average: 153/66     Recent Readings 3/1/2018 2/28/2018 2/28/2018 2/28/2018 2/27/2018    SBP (mmHg) 151 157 179 164 150    DBP (mmHg) 63 66 73 69 73    Pulse 78 76 79 87 83        Ms. Dahl is not feeling well with diltiazem 30 mg BID. She is only taking it at night currently because she started to feel "foggy" again. Advised she communicate this with Dr. He, her cardiologist.     Ms. Dahl informed me that she is now seeing a PCP in [place] per the request of Dr. He. She will no longer be followed within the [facility] system. [facility]             "

## 2018-07-07 RX ORDER — OMEPRAZOLE 20 MG/1
CAPSULE, DELAYED RELEASE ORAL
Qty: 30 CAPSULE | Refills: 0 | Status: SHIPPED | OUTPATIENT
Start: 2018-07-07

## 2018-07-07 NOTE — TELEPHONE ENCOUNTER
Please call patient and schedule patient for an appointment with me for blood pressure and to discuss stomache acid medications. Thank you.

## 2018-10-15 RX ORDER — CLOBETASOL PROPIONATE 0.5 MG/G
CREAM TOPICAL 2 TIMES DAILY
Qty: 45 G | Refills: 1 | Status: SHIPPED | OUTPATIENT
Start: 2018-10-15

## 2018-12-27 ENCOUNTER — PATIENT MESSAGE (OUTPATIENT)
Dept: INTERNAL MEDICINE | Facility: CLINIC | Age: 83
End: 2018-12-27

## 2019-06-01 ENCOUNTER — TELEPHONE (OUTPATIENT)
Dept: ENDOSCOPY | Facility: HOSPITAL | Age: 84
End: 2019-06-01

## 2019-06-01 DIAGNOSIS — K86.3 CYST AND PSEUDOCYST OF PANCREAS: Primary | ICD-10-CM

## 2019-06-01 DIAGNOSIS — K86.2 CYST AND PSEUDOCYST OF PANCREAS: Primary | ICD-10-CM

## 2019-06-05 ENCOUNTER — TELEPHONE (OUTPATIENT)
Dept: ENDOSCOPY | Facility: HOSPITAL | Age: 84
End: 2019-06-05

## 2019-06-13 ENCOUNTER — PATIENT MESSAGE (OUTPATIENT)
Dept: ENDOSCOPY | Facility: HOSPITAL | Age: 84
End: 2019-06-13

## 2019-06-18 ENCOUNTER — PATIENT MESSAGE (OUTPATIENT)
Dept: ENDOSCOPY | Facility: HOSPITAL | Age: 84
End: 2019-06-18

## 2019-07-02 ENCOUNTER — PES CALL (OUTPATIENT)
Dept: ADMINISTRATIVE | Facility: CLINIC | Age: 84
End: 2019-07-02

## 2019-07-05 ENCOUNTER — TELEPHONE (OUTPATIENT)
Dept: ENDOSCOPY | Facility: HOSPITAL | Age: 84
End: 2019-07-05

## 2019-07-05 NOTE — TELEPHONE ENCOUNTER
Spoke with patient in regards to scheduling [facility]. She declined to schedule as she no longer lives in [place]

## 2020-10-01 ENCOUNTER — PATIENT MESSAGE (OUTPATIENT)
Dept: OTHER | Facility: OTHER | Age: 85
End: 2020-10-01

## 2020-10-05 ENCOUNTER — PATIENT MESSAGE (OUTPATIENT)
Dept: ADMINISTRATIVE | Facility: HOSPITAL | Age: 85
End: 2020-10-05

## 2020-12-11 ENCOUNTER — PATIENT MESSAGE (OUTPATIENT)
Dept: OTHER | Facility: OTHER | Age: 85
End: 2020-12-11

## 2021-12-20 NOTE — PROGRESS NOTES
Ms. Dahl is a patient of Dr. Valenzuela and was last seen in [facility] 9/8/16.    Subjective:   Patient ID:  Cassidy Dahl is a 82 y.o. female who presents for follow-up of Coronary Artery Disease (6 months fu)    HPI  Ms. Jain is a 82 y.o. caucasion female being seen today to follow up coronary artery disease,   Patient denies chest pain with exertion or at rest, palpitations, SOB, JOHNSON, dizziness, syncope, edema, orthopnea, PND, or claudication. She can walk up a flight of stairs without stopping for angina or dyspnea.  She had some confusion and slurred speech spring 2016.  There was a stress test done that was abnormal with adequate effort and peak heart rate.  She had a left heart catheterization that showed 60% non-occlusive disease in the LAD D1. Sporatic walking, nothing consistent.  She is not eating a low salt heart healthy diet. Reports decreased coughing with change from Diovan to Hyzaar.  Last lipids 9/23/15.    Stress echo 4/18/16:  EKG Conclusions:  1. The EKG portion of this study is abnormal but non diagnostic for ischemia at a peak heart rate of 125 bpm (93% of predicted).   2. Specificity is reduced secondary to resting ST segment changes.   3. Blood pressure remained stable throughout the protocol  (Presenting BP: 168/69 Peak BP: 153/70).   4. The following arrhythmias were present: PACs & PVCs.   5. There were no symptoms of chest discomfort or significant dyspnea throughout the protocol.   CONCLUSIONS     1 - Enlarged ascending aorta.     2 - Low normal to mildly depressed left ventricular systolic function (EF 50-55%).     3 - Quantitatively measured LV function is 45%.     4 - Left ventricular diastolic dysfunction.     5 - Normal right ventricular systolic function .     Review of Systems   Constitution: Negative for decreased appetite, diaphoresis, weakness, malaise/fatigue, weight gain and weight loss.   HENT: Negative for headaches.    Eyes: Negative for visual disturbance.    Cardiovascular: Negative for chest pain, claudication, dyspnea on exertion, irregular heartbeat, leg swelling, near-syncope, orthopnea, palpitations, paroxysmal nocturnal dyspnea and syncope.        Denies chest pressure   Respiratory: Negative for cough, hemoptysis, shortness of breath, sleep disturbances due to breathing and snoring.    Endocrine: Negative for cold intolerance and heat intolerance.   Hematologic/Lymphatic: Negative for bleeding problem. Does not bruise/bleed easily.   Musculoskeletal: Negative for myalgias.   Gastrointestinal: Negative for bloating, abdominal pain, anorexia, change in bowel habit, constipation, diarrhea, nausea and vomiting.   Neurological: Negative for difficulty with concentration, disturbances in coordination, excessive daytime sleepiness, dizziness, light-headedness, loss of balance and numbness.   Psychiatric/Behavioral: The patient does not have insomnia.      Allergies and current medications updated and reviewed:  Review of patient's allergies indicates:   Allergen Reactions    Iodine      Other reaction(s): Hives, invasive     Current Outpatient Prescriptions   Medication Sig    alclomethasone (ACLOVATE) 0.05 % cream Apply topically 2 (two) times daily. To affected areas on face, mixed with the ciclopirox cream    amlodipine (NORVASC) 5 MG tablet Take 1 tablet (5 mg total) by mouth nightly.    aspirin 81 MG Chew Take 1 tablet (81 mg total) by mouth every evening.    cholecalciferol, vitamin D3, (VITAMIN D3) 1,000 unit capsule Take 1 capsule (1,000 Units total) by mouth once daily.    ciclopirox (LOPROX) 0.77 % Crea Apply topically 2 (two) times daily. To affected areas on face, mixed with the alclometasone cream    clobetasol (TEMOVATE) 0.05 % cream Apply topically 2 (two) times daily.    coenzyme Q10 100 mg capsule Take 1 capsule (100 mg total) by mouth every evening. 1 Capsule Oral Every day- last taken 04/01/2016    econazole nitrate 1 % cream Apply  "topically 2 (two) times daily. To foot X 4 wks    losartan-hydrochlorothiazide 50-12.5 mg (HYZAAR) 50-12.5 mg per tablet Take 1 tablet by mouth once daily.    metoprolol succinate (TOPROL-XL) 50 MG 24 hr tablet Take 1 tablet (50 mg total) by mouth once daily.    multivitamin (ONE DAILY MULTIVITAMIN) per tablet Take 1 tablet by mouth every evening.    omeprazole (PRILOSEC) 20 MG capsule TAKE 1 CAPSULE DAILY    pravastatin (PRAVACHOL) 40 MG tablet Take 1 tablet (40 mg total) by mouth nightly.     No current facility-administered medications for this visit.      Objective:     Right Arm BP - Sittin/63 (17 1354)  Left Arm BP - Sittin/63 (17 1354)    /63 (BP Location: Left arm, Patient Position: Sitting, BP Method: Automatic)  Pulse 64  Ht 5' 7" (1.702 m)  Wt 76.6 kg (168 lb 14 oz)  BMI 26.45 kg/m2    Physical Exam   Constitutional: She is oriented to person, place, and time. Vital signs are normal. She appears well-developed and well-nourished. She is active. No distress.   HENT:   Head: Normocephalic and atraumatic.   Eyes: Conjunctivae and lids are normal. No scleral icterus.   Neck: Neck supple. Normal carotid pulses, no hepatojugular reflux and no JVD present. Carotid bruit is not present.   Cardiovascular: Normal rate, regular rhythm, S1 normal, S2 normal and intact distal pulses.  PMI is not displaced.  Exam reveals no gallop and no friction rub.    No murmur heard.  Pulses:       Carotid pulses are 2+ on the right side, and 2+ on the left side.       Radial pulses are 2+ on the right side, and 2+ on the left side.        Dorsalis pedis pulses are 2+ on the right side, and 2+ on the left side.        Posterior tibial pulses are 1+ on the right side, and 1+ on the left side.   Pulmonary/Chest: Effort normal and breath sounds normal. No respiratory distress. She has no decreased breath sounds. She has no wheezes. She has no rhonchi. She has no rales. She exhibits no tenderness. "   Abdominal: Soft. Normal appearance and bowel sounds are normal. She exhibits no distension, no fluid wave, no abdominal bruit, no ascites and no pulsatile midline mass. There is no hepatosplenomegaly. There is no tenderness.   Musculoskeletal: She exhibits no edema.   Neurological: She is alert and oriented to person, place, and time. Gait normal.   Skin: Skin is warm, dry and intact. No rash noted. She is not diaphoretic. Nails show no clubbing.   Psychiatric: She has a normal mood and affect. Her speech is normal and behavior is normal. Judgment and thought content normal. Cognition and memory are normal.   Nursing note and vitals reviewed.      Chemistry        Component Value Date/Time     05/27/2016 1054    K 5.1 05/27/2016 1054     05/27/2016 1054    CO2 27 05/27/2016 1054    BUN 23 05/27/2016 1054    CREATININE 1.1 05/27/2016 1054    GLU 97 05/27/2016 1054        Component Value Date/Time    CALCIUM 10.1 05/27/2016 1054    ALKPHOS 89 05/27/2016 1054    AST 15 05/27/2016 1054    ALT 10 05/27/2016 1054    BILITOT 0.4 05/27/2016 1054          Recent Labs  Lab 09/23/15  1007  03/24/16  1731  04/25/16  0652   WHITE BLOOD CELL COUNT  --   < >  --   < > 3.30 L   HEMOGLOBIN  --   < >  --   < > 11.5 L   HEMATOCRIT  --   < >  --   < > 35.1 L   MCV  --   < >  --   < > 85   PLATELETS  --   < >  --   < > 325   TSH  --   --  3.028  --   --    CHOLESTEROL 169  --   --   --   --    HDL 59  --   --   --   --    LDL CHOLESTEROL 78.0  --   --   --   --    TRIGLYCERIDES 160 H  --   --   --   --    HDL/CHOLESTEROL RATIO 34.9  --   --   --   --    < > = values in this interval not displayed.      Recent Labs  Lab 03/24/16  1731   INR 1.0      Test(s) Reviewed  I have reviewed the following in detail:  [x] Stress test   [x] Angiography   [x] Echocardiogram   [x] Labs   [] Other:       Assessment:     1. Combined systolic and diastolic cardiac dysfunction  Euvolemic   2. Coronary artery disease involving native  coronary artery of native heart without angina pectoris  Asymptomatic   3. Hyperlipidemia, unspecified hyperlipidemia type  LDL goal <100, at goal   4. Hypertension, essential  At goal, <140/90   5. Gastroesophageal reflux disease without esophagitis  Asymptomatic     Plan:     Combined systolic and diastolic cardiac dysfunction  Comments:  Continue ARB, beta blocker, and thiazide diuretic.    Coronary artery disease involving native coronary artery of native heart without angina pectoris  Comments:  Continue ASA and moderate intensity statin therapy  Orders:  -     Lipid panel; Future; Expected date: 3/21/17    Hyperlipidemia, unspecified hyperlipidemia type  Comments:  Continue Pravastatin 40mg. Repeat lipid panel  Orders:  -     Lipid panel; Future; Expected date: 3/21/17    Hypertension, essential  Comments:  Continue current regimen  Orders:  -     Comprehensive metabolic panel; Future; Expected date: 3/21/17    Gastroesophageal reflux disease without esophagitis  Comments:  Continue Omeprazole. Follow with PCP.    Ms. Dahl is doing well from a cardiovascular stand point.  She will get her annual labs drawn for lipids and cmp to evaluate kidney and liver function on Friday when she returns fasting.  She should Return in about 1 year (around 3/21/2018). or sooner if she has cardiac sx   Viral syndrome